# Patient Record
Sex: FEMALE | Race: OTHER | Employment: FULL TIME | URBAN - METROPOLITAN AREA
[De-identification: names, ages, dates, MRNs, and addresses within clinical notes are randomized per-mention and may not be internally consistent; named-entity substitution may affect disease eponyms.]

---

## 2017-01-24 ENCOUNTER — TELEPHONE (OUTPATIENT)
Dept: OBGYN CLINIC | Facility: CLINIC | Age: 32
End: 2017-01-24

## 2017-04-03 ENCOUNTER — OFFICE VISIT (OUTPATIENT)
Dept: OBGYN CLINIC | Facility: CLINIC | Age: 32
End: 2017-04-03

## 2017-04-03 VITALS
HEART RATE: 71 BPM | SYSTOLIC BLOOD PRESSURE: 127 MMHG | BODY MASS INDEX: 43.96 KG/M2 | HEIGHT: 62.5 IN | WEIGHT: 245 LBS | DIASTOLIC BLOOD PRESSURE: 84 MMHG

## 2017-04-03 DIAGNOSIS — Z12.4 CERVICAL CANCER SCREENING: ICD-10-CM

## 2017-04-03 DIAGNOSIS — Z01.419 ENCOUNTER FOR GYNECOLOGICAL EXAMINATION WITHOUT ABNORMAL FINDING: Primary | ICD-10-CM

## 2017-04-03 PROBLEM — Z86.711 HISTORY OF PULMONARY EMBOLISM: Status: ACTIVE | Noted: 2017-04-03

## 2017-04-03 PROCEDURE — 99395 PREV VISIT EST AGE 18-39: CPT | Performed by: OBSTETRICS & GYNECOLOGY

## 2017-04-03 NOTE — PROGRESS NOTES
Well Woman Exam    HPI:  The patient is a 28yo female who presents for annual exam. Pt had paragard IUD placed about three years ago. She has a history of PE/DVT. She states her periods are regular and overall light (heavy bleeding for two days).  She is co Controlled       MEDICATIONS:    Current outpatient prescriptions:   •  PARAGARD INTRAUTERINE COPPER Intrauterine IUD, 1, Disp: 1 Device, Rfl: 0    ALLERGIES:  No Known Allergies      Review of Systems:  Constitutional:  Denies fevers and chills   Car with the patient \  2. Cotesting ordered today- negative Pap smear in 2015   2. Contraception:   1. Paragard IUD  3. Preconception Counseling  1. Reviewed need to see MFM and anticoagulation before getting pregnant   4. Breast Health:     1.  Reviewed curre

## 2017-04-05 ENCOUNTER — TELEPHONE (OUTPATIENT)
Dept: OBGYN CLINIC | Facility: CLINIC | Age: 32
End: 2017-04-05

## 2017-04-05 NOTE — TELEPHONE ENCOUNTER
----- Message from Linh Saunders MD sent at 4/5/2017  2:21 PM CDT -----  Please send a letter to patient:    Your most recent Pap Smear and HPV were negative. Please make sure you call to make an appointment for an annual exam in one year.  If you have a

## 2018-01-23 ENCOUNTER — OFFICE VISIT (OUTPATIENT)
Dept: FAMILY MEDICINE CLINIC | Facility: CLINIC | Age: 33
End: 2018-01-23

## 2018-01-23 VITALS
HEART RATE: 76 BPM | SYSTOLIC BLOOD PRESSURE: 120 MMHG | HEIGHT: 62 IN | BODY MASS INDEX: 42.69 KG/M2 | WEIGHT: 232 LBS | DIASTOLIC BLOOD PRESSURE: 80 MMHG

## 2018-01-23 DIAGNOSIS — Z00.00 ROUTINE PHYSICAL EXAMINATION: Primary | ICD-10-CM

## 2018-01-23 DIAGNOSIS — Z86.711 HISTORY OF PULMONARY EMBOLISM: ICD-10-CM

## 2018-01-23 PROCEDURE — 90686 IIV4 VACC NO PRSV 0.5 ML IM: CPT | Performed by: FAMILY MEDICINE

## 2018-01-23 PROCEDURE — 99395 PREV VISIT EST AGE 18-39: CPT | Performed by: FAMILY MEDICINE

## 2018-01-23 PROCEDURE — 90471 IMMUNIZATION ADMIN: CPT | Performed by: FAMILY MEDICINE

## 2018-01-23 NOTE — PROGRESS NOTES
Blood pressure 120/80, pulse 76, height 5' 2\" (1.575 m), weight 232 lb (105.2 kg), not currently breastfeeding. REASON FOR VISIT:    Govind Clalejas is a 33year old female who presents for an [address]         Patient Active Problem L age<25, if sex active/on OCPs; >24 high risk No results found for: CHLAMYDIA, CPCRS    Colonoscopy Screen Every 10 years There are no preventive care reminders to display for this patient.     Flex Sigmoidoscopy Screen  Every 5 years No results found for th 12/23/2011 63    No flowsheet data found. Dilated Eye exam  Annually No flowsheet data found. No flowsheet data found. Asthma  (Annually between Nov. 1 & Dec. 31)    Date of last AAP/ACT and counseling given on importance of controller meds. EOMI, normal optic disk, conjunctiva are clear  NECK: supple, no adenopathy, no bruits  CHEST: no chest tenderness  BREAST: no dominant or suspicious mass  LUNGS: clear to auscultation  CARDIO: RRR without murmur  GI: good BS's, no masses, HSM or tendernes

## 2018-01-29 ENCOUNTER — TELEPHONE (OUTPATIENT)
Dept: FAMILY MEDICINE CLINIC | Facility: CLINIC | Age: 33
End: 2018-01-29

## 2018-01-29 ENCOUNTER — OFFICE VISIT (OUTPATIENT)
Dept: FAMILY MEDICINE CLINIC | Facility: CLINIC | Age: 33
End: 2018-01-29

## 2018-01-29 ENCOUNTER — APPOINTMENT (OUTPATIENT)
Dept: LAB | Age: 33
End: 2018-01-29
Attending: FAMILY MEDICINE
Payer: COMMERCIAL

## 2018-01-29 VITALS
DIASTOLIC BLOOD PRESSURE: 80 MMHG | WEIGHT: 227 LBS | HEIGHT: 62 IN | TEMPERATURE: 98 F | BODY MASS INDEX: 41.77 KG/M2 | HEART RATE: 90 BPM | SYSTOLIC BLOOD PRESSURE: 123 MMHG

## 2018-01-29 DIAGNOSIS — Z00.00 ROUTINE PHYSICAL EXAMINATION: ICD-10-CM

## 2018-01-29 DIAGNOSIS — J06.9 UPPER RESPIRATORY TRACT INFECTION, UNSPECIFIED TYPE: ICD-10-CM

## 2018-01-29 DIAGNOSIS — Z86.711 HISTORY OF PULMONARY EMBOLISM: ICD-10-CM

## 2018-01-29 DIAGNOSIS — J02.9 ACUTE PHARYNGITIS, UNSPECIFIED ETIOLOGY: Primary | ICD-10-CM

## 2018-01-29 DIAGNOSIS — R73.01 IFG (IMPAIRED FASTING GLUCOSE): Primary | ICD-10-CM

## 2018-01-29 LAB
CHOLEST SERPL-MCNC: 123 MG/DL (ref 110–200)
CONTROL LINE PRESENT WITH A CLEAR BACKGROUND (YES/NO): YES YES/NO
GLUCOSE SERPL-MCNC: 102 MG/DL (ref 70–99)
HDLC SERPL-MCNC: 49 MG/DL
KIT LOT #: NORMAL NUMERIC
LDLC SERPL CALC-MCNC: 59 MG/DL (ref 0–99)
NONHDLC SERPL-MCNC: 74 MG/DL
STREP GRP A CUL-SCR: NEGATIVE
TRIGL SERPL-MCNC: 74 MG/DL (ref 1–149)

## 2018-01-29 PROCEDURE — 82947 ASSAY GLUCOSE BLOOD QUANT: CPT

## 2018-01-29 PROCEDURE — 80061 LIPID PANEL: CPT

## 2018-01-29 PROCEDURE — 87880 STREP A ASSAY W/OPTIC: CPT | Performed by: FAMILY MEDICINE

## 2018-01-29 PROCEDURE — 99213 OFFICE O/P EST LOW 20 MIN: CPT | Performed by: FAMILY MEDICINE

## 2018-01-29 PROCEDURE — 99212 OFFICE O/P EST SF 10 MIN: CPT | Performed by: FAMILY MEDICINE

## 2018-01-29 PROCEDURE — 36415 COLL VENOUS BLD VENIPUNCTURE: CPT

## 2018-01-29 RX ORDER — AZELASTINE 1 MG/ML
2 SPRAY, METERED NASAL 2 TIMES DAILY
Qty: 1 BOTTLE | Refills: 0 | Status: SHIPPED | OUTPATIENT
Start: 2018-01-29 | End: 2018-05-25

## 2018-01-29 RX ORDER — DEXAMETHASONE 4 MG/1
4 TABLET ORAL
Qty: 5 TABLET | Refills: 0 | Status: SHIPPED | OUTPATIENT
Start: 2018-01-29 | End: 2018-02-03

## 2018-01-29 NOTE — TELEPHONE ENCOUNTER
Pt calling to report Angela told her Decadron rx not received by them. Spoke to pt [facility] and pharmacist states they do have rx for decadron and currently in process to be filled. They will contact pt when ready.

## 2018-01-29 NOTE — TELEPHONE ENCOUNTER
Pt would like to know if the medication sent to pharmacy will help with her fever. Informed pt to take OTC fever reducer. Tylenol or ibuprofen to help with fever. If not improved or fever return. Call office or f/u. D.r Canandaigua Countess agreed.  No further questions

## 2018-01-29 NOTE — PROGRESS NOTES
Patient ID: Carmelo Biswas is a 33year old female. HPI  Patient presents with:  Sore Throat  Ear Pain  Body ache and/or chills    Sore throat started yesterday morning. Later on in the day she started having some ear pain bilaterally.   The ear terry (103 kg), not currently breastfeeding. Physical Exam   Constitutional: Patient is oriented to person, place, and time. Patient appears well-developed and well-nourished. No distress. HENT:   Head: Normocephalic.    Right Ear: Tympanic membrane, external to plan.          [address] [place] [address], DO  1/29/2018

## 2018-01-29 NOTE — TELEPHONE ENCOUNTER
Pt was seen today . pharmacy stts they were informed by pt a script for antibiotic was prescribed, pharmacy did not receive script. Please advise.

## 2018-05-25 ENCOUNTER — OFFICE VISIT (OUTPATIENT)
Dept: OBGYN CLINIC | Facility: CLINIC | Age: 33
End: 2018-05-25

## 2018-05-25 VITALS
HEART RATE: 57 BPM | WEIGHT: 221.63 LBS | BODY MASS INDEX: 41 KG/M2 | SYSTOLIC BLOOD PRESSURE: 119 MMHG | DIASTOLIC BLOOD PRESSURE: 77 MMHG

## 2018-05-25 DIAGNOSIS — N89.8 VAGINAL ODOR: ICD-10-CM

## 2018-05-25 DIAGNOSIS — Z01.419 ENCOUNTER FOR GYNECOLOGICAL EXAMINATION WITHOUT ABNORMAL FINDING: Primary | ICD-10-CM

## 2018-05-25 PROCEDURE — 99395 PREV VISIT EST AGE 18-39: CPT | Performed by: OBSTETRICS & GYNECOLOGY

## 2018-05-25 NOTE — PROGRESS NOTES
Well Woman Exam    HPI:  The patient is a 34yo female who presents for annual exam. She has no complaints at this time. She is doing well with paragard. She feels she may have a slight fishy odor. No itching burning, irritation.  She is getting engaged next palpitations  Respiratory:  denies shortness of breath  Gastrointestinal:  denies nausea, vomiting diarrhea or constipation  Genitourinary:  denies dysuria, incontinence, abnormal vaginal discharge, vaginal itching  Musculoskeletal:  denies back pain.   Ski the individual  2. Reviewed breast self awareness   4. Vaginal culture for vaginal odor  5.  Follow up in 1 year

## 2018-05-29 ENCOUNTER — TELEPHONE (OUTPATIENT)
Dept: OBGYN CLINIC | Facility: CLINIC | Age: 33
End: 2018-05-29

## 2018-05-29 ENCOUNTER — MOBILE ENCOUNTER (OUTPATIENT)
Dept: OBGYN CLINIC | Facility: CLINIC | Age: 33
End: 2018-05-29

## 2018-05-29 RX ORDER — METRONIDAZOLE 500 MG/1
500 TABLET ORAL 2 TIMES DAILY
Qty: 14 TABLET | Refills: 0 | Status: SHIPPED | OUTPATIENT
Start: 2018-05-29 | End: 2018-06-05

## 2018-05-29 NOTE — TELEPHONE ENCOUNTER
Pt informed of results and recs. Advised pt to avoid etoh during tx because it can make her very ill. Pt verbalized understanding.  Rx sent to [facility] in [place] [facility] per pt request.

## 2018-05-29 NOTE — TELEPHONE ENCOUNTER
----- Message from Vickie Prader, MD sent at 5/26/2018  7:38 PM CDT -----  Please let patient know she has BV and send eRx for Flagyl 500mg BID for 7 days

## 2019-06-11 ENCOUNTER — OFFICE VISIT (OUTPATIENT)
Dept: OBGYN CLINIC | Facility: CLINIC | Age: 34
End: 2019-06-11

## 2019-06-11 VITALS
HEART RATE: 63 BPM | SYSTOLIC BLOOD PRESSURE: 129 MMHG | BODY MASS INDEX: 45 KG/M2 | DIASTOLIC BLOOD PRESSURE: 82 MMHG | WEIGHT: 244 LBS

## 2019-06-11 DIAGNOSIS — Z01.419 ENCOUNTER FOR GYNECOLOGICAL EXAMINATION WITHOUT ABNORMAL FINDING: Primary | ICD-10-CM

## 2019-06-11 DIAGNOSIS — Z30.431 IUD CHECK UP: ICD-10-CM

## 2019-06-11 PROCEDURE — 99395 PREV VISIT EST AGE 18-39: CPT | Performed by: OBSTETRICS & GYNECOLOGY

## 2019-06-11 NOTE — PROGRESS NOTES
Well Woman Exam    HPI:  The patient is a 33yo female who presents for annual exam. She has no complaints. She is doing well with Paragard IUD. She denies vaginal itching or odor this year.    Pt getting  in Oct. She would like to go to [facility] for week: Not on file        Minutes per session: Not on file      Stress: Not on file    Relationships      Social connections:        Talks on phone: Not on file        Gets together: Not on file        Attends [employer] service: Not on file        Active me 06/11/19  1619   BP: 129/82   Pulse: 63       PHYSICAL EXAM:   Constitutional: well developed, well nourished  Head/Face: normocephalic  Neck/Thyroid: thyroid symmetric, no thyromegaly, no nodules, no adenopathy  Heart: Regular rate and rhythm   Lungs: marcia

## 2019-10-17 ENCOUNTER — OFFICE VISIT (OUTPATIENT)
Dept: FAMILY MEDICINE CLINIC | Facility: CLINIC | Age: 34
End: 2019-10-17

## 2019-10-17 VITALS
BODY MASS INDEX: 44.16 KG/M2 | WEIGHT: 240 LBS | HEART RATE: 61 BPM | SYSTOLIC BLOOD PRESSURE: 122 MMHG | DIASTOLIC BLOOD PRESSURE: 81 MMHG | HEIGHT: 62 IN

## 2019-10-17 DIAGNOSIS — I83.813 VARICOSE VEINS OF BOTH LOWER EXTREMITIES WITH PAIN: Primary | ICD-10-CM

## 2019-10-17 PROCEDURE — 99213 OFFICE O/P EST LOW 20 MIN: CPT | Performed by: FAMILY MEDICINE

## 2019-10-17 NOTE — PROGRESS NOTES
Blood pressure 122/81, pulse 61, height 5' 2\" (1.575 m), weight 240 lb (108.9 kg), not currently breastfeeding. History of pulmonary embolism concerned about varicose vein on the right thigh.   Birth control pills were the cause of the pulmonary embolis

## 2019-12-24 ENCOUNTER — NURSE TRIAGE (OUTPATIENT)
Dept: FAMILY MEDICINE CLINIC | Facility: CLINIC | Age: 34
End: 2019-12-24

## 2019-12-24 NOTE — TELEPHONE ENCOUNTER
Action Requested: Summary for Provider     []  Critical Lab, Recommendations Needed  [] Need Additional Advice  []   FYI    []   Need Orders  [] Need Medications Sent to Pharmacy  []  Other     SUMMARY: Spoke with the patient who reports she went to the ER

## 2019-12-24 NOTE — TELEPHONE ENCOUNTER
Pt scheduled appt through [facility] with following sx:    Appointment For: Gloria Brito ([id number])   Visit Type: [employer] EXAM (2964)      1/8/2020     1:40 PM  20 mins. DO AURELIO [facility]      Patient Comments:   Back pain &

## 2019-12-26 ENCOUNTER — OFFICE VISIT (OUTPATIENT)
Dept: FAMILY MEDICINE CLINIC | Facility: CLINIC | Age: 34
End: 2019-12-26

## 2019-12-26 VITALS
BODY MASS INDEX: 45.08 KG/M2 | WEIGHT: 245 LBS | SYSTOLIC BLOOD PRESSURE: 126 MMHG | DIASTOLIC BLOOD PRESSURE: 83 MMHG | HEART RATE: 84 BPM | HEIGHT: 62 IN

## 2019-12-26 DIAGNOSIS — S39.012A STRAIN OF LUMBAR REGION, INITIAL ENCOUNTER: Primary | ICD-10-CM

## 2019-12-26 DIAGNOSIS — H53.40 VISUAL FIELD LOSS: ICD-10-CM

## 2019-12-26 PROCEDURE — 99213 OFFICE O/P EST LOW 20 MIN: CPT | Performed by: FAMILY MEDICINE

## 2019-12-26 RX ORDER — IBUPROFEN 600 MG/1
TABLET ORAL
COMMUNITY
Start: 2019-12-10 | End: 2020-02-12

## 2019-12-26 RX ORDER — DIAZEPAM 5 MG/1
TABLET ORAL
COMMUNITY
Start: 2019-12-10 | End: 2020-02-12

## 2019-12-31 NOTE — PROGRESS NOTES
Blood pressure 126/83, pulse 84, height 5' 2\" (1.575 m), weight 245 lb (111.1 kg), not currently breastfeeding. Patient presents today following up for episode of back pain that began December 8 has improved now it is currently about 2/10.   Shelley mcdaniel Thoracentesis began at 1004  Under one  Liter Straw fluid removed from pleural space  Patient tolerated procedure well.  Dressing CDI, no swelling or hematoma.  Report called to receiving RN.

## 2020-01-07 ENCOUNTER — TELEPHONE (OUTPATIENT)
Dept: FAMILY MEDICINE CLINIC | Facility: CLINIC | Age: 35
End: 2020-01-07

## 2020-01-07 NOTE — TELEPHONE ENCOUNTER
Please clarify with office of Dr. Myla Dow if he wants patient to be referred for neuro ophthalmology eval.

## 2020-01-08 NOTE — TELEPHONE ENCOUNTER
phone: [phone] (Dr Link Del Toro)   Office sf closed,   Left VM for staff to return phone call, see message below

## 2020-01-10 NOTE — TELEPHONE ENCOUNTER
Left detailed message with info below. To call back to clarify [facility] question.

## 2020-01-13 ENCOUNTER — PATIENT MESSAGE (OUTPATIENT)
Dept: FAMILY MEDICINE CLINIC | Facility: CLINIC | Age: 35
End: 2020-01-13

## 2020-01-13 ENCOUNTER — TELEPHONE (OUTPATIENT)
Dept: FAMILY MEDICINE CLINIC | Facility: CLINIC | Age: 35
End: 2020-01-13

## 2020-01-13 DIAGNOSIS — H53.16: Primary | ICD-10-CM

## 2020-01-13 NOTE — TELEPHONE ENCOUNTER
Naheed Valente from Dr Madison Wallace returning call please call other back @ [phone]. Please call her back.

## 2020-01-13 NOTE — TELEPHONE ENCOUNTER
Received call from Dr. Wayne Slight office, spoke to Ann-Marie, states Dr. Reji Weiner does want patient to be evaluated from neuro ophthalmology.

## 2020-01-13 NOTE — TELEPHONE ENCOUNTER
Attempted to call office, no answer. Left detailed vm regarding message below. Provided our office number in vm.

## 2020-01-14 NOTE — TELEPHONE ENCOUNTER
Spoke with pt to give central scheduling phone number. Pt states she was informed MRI has been ordered and has just scheduled an appointment to have test don 1/24/20.

## 2020-01-14 NOTE — TELEPHONE ENCOUNTER
From: Opal Fernández  To: Analisa Fuller DO  Sent: 1/13/2020 9:06 AM CST  Subject: Visit Follow-up Question    On 12.26.19 I saw Dr. Larissa Nixon for an issue with vision loss. He referred me to an ophthalmologist, who I saw on 12.27.19.  The ophthalmolo

## 2020-01-14 NOTE — TELEPHONE ENCOUNTER
----- Message from Marck Hinojosa sent at 1/13/2020  9:06 AM CST -----  Regarding: Visit Follow-up Question  Contact: [phone]  On 12.26.19 I saw Dr. Jarvis Thompson for an issue with vision loss.  He referred me to an ophthalmologist, who I saw on 12.27.19

## 2020-01-14 NOTE — TELEPHONE ENCOUNTER
DAXA Hernandez calling with Dr. Eric Madrid (neuro opthalmologist) office attempting to order MRI Brain and orbits with/without contrast for partial loss of vision in right eye.  DAXA Hernandez states unable to order under test under Dr. Deandra Piña when kings

## 2020-01-16 ENCOUNTER — TELEPHONE (OUTPATIENT)
Dept: FAMILY MEDICINE CLINIC | Facility: CLINIC | Age: 35
End: 2020-01-16

## 2020-01-16 DIAGNOSIS — H54.7 VISUAL LOSS: Primary | ICD-10-CM

## 2020-01-16 NOTE — TELEPHONE ENCOUNTER
The MRI technician called on behalf of radiologist requesting that the order for the MRI BRAIN/ORBITS  be changed to with and without contrast   Patient is scheduled for MRI tomorrow at 11 a.m.      Dr. Rosi Hudson:   Please see pended order and sign

## 2020-01-17 ENCOUNTER — PATIENT MESSAGE (OUTPATIENT)
Dept: FAMILY MEDICINE CLINIC | Facility: CLINIC | Age: 35
End: 2020-01-17

## 2020-01-17 ENCOUNTER — TELEPHONE (OUTPATIENT)
Dept: FAMILY MEDICINE CLINIC | Facility: CLINIC | Age: 35
End: 2020-01-17

## 2020-01-17 ENCOUNTER — HOSPITAL ENCOUNTER (OUTPATIENT)
Dept: MRI IMAGING | Facility: HOSPITAL | Age: 35
Discharge: HOME | End: 2020-01-17
Attending: FAMILY MEDICINE
Payer: COMMERCIAL

## 2020-01-17 DIAGNOSIS — H54.7 VISUAL LOSS: ICD-10-CM

## 2020-01-17 DIAGNOSIS — H53.16: ICD-10-CM

## 2020-01-17 PROCEDURE — A9575 INJ GADOTERATE MEGLUMI 0.1ML: HCPCS | Performed by: FAMILY MEDICINE

## 2020-01-17 PROCEDURE — 70543 MRI ORBT/FAC/NCK W/O &W/DYE: CPT | Performed by: FAMILY MEDICINE

## 2020-01-17 PROCEDURE — 70553 MRI BRAIN STEM W/O & W/DYE: CPT | Performed by: FAMILY MEDICINE

## 2020-01-17 NOTE — TELEPHONE ENCOUNTER
Dr. Javier Hendrix, please advise on patient's most recent mychart message. Called Dr. Maikel Shetty' office and call is routed to answering service. Nurses please follow up tomorrow.

## 2020-01-17 NOTE — TELEPHONE ENCOUNTER
Please see My Chart message and advise to pt request for results. Thank you. Attempted to call Dr Belle Philippe [facility] Op[facility] for fax number. Office is closed.   Nurses please call back and fax results per Dr Luann Rey

## 2020-01-17 NOTE — TELEPHONE ENCOUNTER
From: Corona May  To: Ridge Fuller DO  Sent: 1/17/2020 1:47 PM CST  Subject: Test Results Question    I have a question about MRI Scan Head or Neck resulted on 1/17/20, 1:26 PM.    Will I be hearing from you in regards to my test results?  What

## 2020-01-17 NOTE — TELEPHONE ENCOUNTER
Dr. Kapadia Cooper:     Patient has more questions about MRI orbits results. See message in My Chart sent at 3:15 p.m. Patient is requesting that you call her to discuss results. Patient states she is available the rest of the day.

## 2020-01-17 NOTE — TELEPHONE ENCOUNTER
Barbara Crain   to Santhosh Galdamez, DO           1/17/20 3:15 PM   Ok, but the test result said:     Tiny Chronic Lacunar Infarct on the right side of my brain, what is that? Should I be doing something to prevent it?      If I understand correctly, I have a question about MRI Scan Head or Neck resulted on 1/17/20, 1:26 PM.    Will I be hearing from you in regards to my test results? What are my next steps? Thank you.    Jacquie Zurita   to Stuart Bear, DO

## 2020-01-17 NOTE — TELEPHONE ENCOUNTER
Called patient and discussed I advised her to contact dr. Ivis Fletcher also that she should go to er if visual loss occurs again.

## 2020-01-18 NOTE — TELEPHONE ENCOUNTER
Attempted to call Dr Gearold Litten Neuro Ophthal[facility] for fax number. Office is closed today, Sat.  Nurses please call back Monday and fax results per Dr Ananda Edouard message below.      Office number for Dr Ila Gao [phone]

## 2020-01-20 ENCOUNTER — TELEPHONE (OUTPATIENT)
Dept: FAMILY MEDICINE CLINIC | Facility: CLINIC | Age: 35
End: 2020-01-20

## 2020-01-20 NOTE — TELEPHONE ENCOUNTER
----- Message from González Horvath RN sent at 1/20/2020 10:50 AM CST -----    ----- Message -----  From: Kingston Amador DO  Sent: 1/17/2020   1:39 PM CST  To: Em Rn Triage    Please forward MRI results to Dr. Teofilo Dunlap Neuro Ophthalmology.

## 2020-01-20 NOTE — TELEPHONE ENCOUNTER
Spoke with Dr Zeynep Brito office and fax is [phone]    MRI 1/16/20 and 1/17/20 faxed to Dr Zeynep Brito office.

## 2020-02-04 ENCOUNTER — TELEPHONE (OUTPATIENT)
Dept: FAMILY MEDICINE CLINIC | Facility: CLINIC | Age: 35
End: 2020-02-04

## 2020-02-04 DIAGNOSIS — H54.7 VISUAL LOSS: Primary | ICD-10-CM

## 2020-02-04 DIAGNOSIS — Z86.711 HISTORY OF PULMONARY EMBOLISM: ICD-10-CM

## 2020-02-04 NOTE — TELEPHONE ENCOUNTER
Spoke with patient ( verified)--reports Dr. Marianela Hatch ([phone]) sent letter to Dr. Dipesh Riley 2020 to order lab tests (clotting factors per patient) to further evaluate ongoing vision loss symptoms.     Patient and Dr. Karyna Reynaga concerned [facility]

## 2020-02-05 NOTE — TELEPHONE ENCOUNTER
Delivery Read From Message Type Attachments Subject   2/4/2020  4:02 PM [place] Morning Patient Medical Advice Request  Orders

## 2020-02-12 ENCOUNTER — TELEPHONE (OUTPATIENT)
Dept: NEUROLOGY | Facility: CLINIC | Age: 35
End: 2020-02-12

## 2020-02-12 ENCOUNTER — OFFICE VISIT (OUTPATIENT)
Dept: NEUROLOGY | Facility: CLINIC | Age: 35
End: 2020-02-12

## 2020-02-12 VITALS
HEIGHT: 63 IN | HEART RATE: 72 BPM | WEIGHT: 231 LBS | SYSTOLIC BLOOD PRESSURE: 122 MMHG | DIASTOLIC BLOOD PRESSURE: 82 MMHG | BODY MASS INDEX: 40.93 KG/M2

## 2020-02-12 DIAGNOSIS — R29.818 TRANSIENT NEUROLOGIC DEFICIT: ICD-10-CM

## 2020-02-12 DIAGNOSIS — R93.0 ABNORMAL MRI OF HEAD: Primary | ICD-10-CM

## 2020-02-12 PROCEDURE — 99205 OFFICE O/P NEW HI 60 MIN: CPT | Performed by: OTHER

## 2020-02-12 NOTE — TELEPHONE ENCOUNTER
Pt. informed insurance was verified and 2 D echo doppler/bubbles cpt code 02400  is a covered benefit and does not require authorization. She will call [place] scheduling to schedule appt.

## 2020-02-12 NOTE — PROGRESS NOTES
Neurology Initial Visit     Referred By: Dr. Langley Friend    Chief Complaint: Patient presents with:  Vision Problem: Patient presents today c/o vision trouble in right eye.  She states that it started about 6 months ago where she initially she lost bottom ha standard drinks/week   Comment: Occasionally       Drug use:    Types: Cannabis   Comment: RARELY       Family History   Problem Relation Age of Onset   • Hypertension Mother         Controlled         Current Outpatient Medications:   •  PARAGARD INTRAUTE sign    Coordination:  Finger to nose intact  Rapid alternating movements intact    Gait:  Normal posture  Normal physiologic      Labs:    Lab Results   Component Value Date    TSH 2.81 02/07/2012     Lab Results   Component Value Date    HDL 49 01/29/201 understanding of information given. All questions were answered. All side effects of drugs were discussed. Return to clinic in: Return if symptoms worsen or fail to improve.     Anais Awad MD

## 2020-02-18 ENCOUNTER — HOSPITAL ENCOUNTER (OUTPATIENT)
Dept: CV DIAGNOSTICS | Facility: HOSPITAL | Age: 35
Discharge: HOME | End: 2020-02-18
Attending: FAMILY MEDICINE
Payer: COMMERCIAL

## 2020-02-18 ENCOUNTER — LAB ENCOUNTER (OUTPATIENT)
Dept: LAB | Facility: HOSPITAL | Age: 35
End: 2020-02-18
Attending: FAMILY MEDICINE
Payer: COMMERCIAL

## 2020-02-18 DIAGNOSIS — H54.7 VISUAL LOSS: ICD-10-CM

## 2020-02-18 DIAGNOSIS — H54.7 UNSPECIFIED VISUAL LOSS: Primary | ICD-10-CM

## 2020-02-18 DIAGNOSIS — Z86.711 HISTORY OF PULMONARY EMBOLISM: ICD-10-CM

## 2020-02-18 DIAGNOSIS — Z86.711 PERSONAL HISTORY OF PE (PULMONARY EMBOLISM): ICD-10-CM

## 2020-02-18 LAB — HCYS SERPL-SCNC: 7.6 UMOL/L (ref 3.2–10.7)

## 2020-02-18 PROCEDURE — 85306 CLOT INHIBIT PROT S FREE: CPT

## 2020-02-18 PROCEDURE — 85300 ANTITHROMBIN III ACTIVITY: CPT

## 2020-02-18 PROCEDURE — 83090 ASSAY OF HOMOCYSTEINE: CPT

## 2020-02-18 PROCEDURE — 86147 CARDIOLIPIN ANTIBODY EA IG: CPT

## 2020-02-18 PROCEDURE — 86255 FLUORESCENT ANTIBODY SCREEN: CPT

## 2020-02-18 PROCEDURE — 81241 F5 GENE: CPT

## 2020-02-18 PROCEDURE — 85549 MURAMIDASE: CPT

## 2020-02-18 PROCEDURE — 93306 TTE W/DOPPLER COMPLETE: CPT | Performed by: FAMILY MEDICINE

## 2020-02-18 PROCEDURE — 85302 CLOT INHIBIT PROT C ANTIGEN: CPT

## 2020-02-18 PROCEDURE — 36415 COLL VENOUS BLD VENIPUNCTURE: CPT

## 2020-02-19 LAB — F5 P.R506Q BLD/T QL: NORMAL

## 2020-02-20 LAB
ANTITHROMBIN, ENZYMATIC (ACTIVITY): 109 %
CARDIOLIPIN IGG SERPL-ACNC: 3.9 GPL (ref 0–14.9)
CARDIOLIPIN IGM SERPL-ACNC: 9.4 MPL (ref 0–12.4)
PROTEIN S FUNCTIONAL: 110 %

## 2020-02-21 LAB — PROTEIN C, TOTAL ANTIGEN: >95 %

## 2020-02-22 LAB — LYSOZYME, SERUM OR BODY FLUID: 0.98 UG/ML

## 2020-02-27 ENCOUNTER — OFFICE VISIT (OUTPATIENT)
Dept: CARDIOLOGY CLINIC | Facility: CLINIC | Age: 35
End: 2020-02-27

## 2020-02-27 ENCOUNTER — NURSE ONLY (OUTPATIENT)
Dept: CARDIOLOGY CLINIC | Facility: CLINIC | Age: 35
End: 2020-02-27

## 2020-02-27 VITALS
HEIGHT: 63 IN | RESPIRATION RATE: 18 BRPM | BODY MASS INDEX: 41.16 KG/M2 | DIASTOLIC BLOOD PRESSURE: 87 MMHG | WEIGHT: 232.31 LBS | SYSTOLIC BLOOD PRESSURE: 128 MMHG | HEART RATE: 79 BPM

## 2020-02-27 DIAGNOSIS — H53.469: ICD-10-CM

## 2020-02-27 DIAGNOSIS — G45.3 AMAUROSIS FUGAX OF RIGHT EYE: ICD-10-CM

## 2020-02-27 DIAGNOSIS — I35.1 MODERATE AORTIC REGURGITATION: Primary | ICD-10-CM

## 2020-02-27 DIAGNOSIS — Z86.711 HISTORY OF PULMONARY EMBOLISM: ICD-10-CM

## 2020-02-27 DIAGNOSIS — I63.81 LACUNAR INFARCTION (HCC): ICD-10-CM

## 2020-02-27 PROCEDURE — 99245 OFF/OP CONSLTJ NEW/EST HI 55: CPT | Performed by: INTERNAL MEDICINE

## 2020-02-27 PROCEDURE — 93000 ELECTROCARDIOGRAM COMPLETE: CPT | Performed by: INTERNAL MEDICINE

## 2020-02-27 PROCEDURE — 93270 REMOTE 30 DAY ECG REV/REPORT: CPT | Performed by: INTERNAL MEDICINE

## 2020-02-27 RX ORDER — ASPIRIN 81 MG/1
81 TABLET, CHEWABLE ORAL DAILY
Qty: 365 TABLET | Refills: 0 | Status: SHIPPED | OUTPATIENT
Start: 2020-02-27

## 2020-02-27 NOTE — PROGRESS NOTES
Event monitor placed on patient during visit today. Instructed patient on how to place/remove device and care for the monitor. Instructed patient to notify ordering physician if symptoms worsen  during monitoring.  Patient was instructed to call [facility]

## 2020-02-27 NOTE — PATIENT INSTRUCTIONS
-Start aspirin 81 mg daily  -Cardiac MRI  -Cancel Holter monitor  -Complete 30-day event monitor  -Follow-up above results with Dr. Cesar Rizo, and if indicated proceed with loop recorder implantation  -Delay family planning/pregnancy until cardiac diagnoses and

## 2020-02-27 NOTE — H&P
[facility]    Cardiac Electrophysiology Consultation  2020    Name:  Oli Bender  : 3/24/1985    Date of consultation:   2020    Referring physician: Dr. Danielle Almodovar    Reason for Consultation:  Abnormal echocardiogram    History of P Recently . Allergies:  No Known Allergies    Medications:  PARAGARD INTRAUTERINE COPPER Intrauterine IUD, 1, Disp: 1 Device, Rfl: 0    No current facility-administered medications on file prior to visit.      Review of Systems:  GENERAL: no fevers mildly     calcified leaflets. Moderate regurgitation.     IMPRESSIONS / RECOMMENDATIONS:  Moderate aortic regurgitation  (primary encounter diagnosis)  Amaurosis fugax of right eye  Transient homonymous hemianopia, unspecified laterality  Lacunar infarctio thyroid, and lipids. If these have not been performed, we will order them before our next visit. Thank you for the consultation.      Dearkaroline Ramirez MD  Cardiology/Cardiac Electrophysiology  2/27/2020

## 2020-03-04 ENCOUNTER — TELEPHONE (OUTPATIENT)
Dept: NEUROLOGY | Facility: CLINIC | Age: 35
End: 2020-03-04

## 2020-03-04 ENCOUNTER — APPOINTMENT (OUTPATIENT)
Dept: CV DIAGNOSTICS | Facility: HOSPITAL | Age: 35
End: 2020-03-04
Attending: Other
Payer: COMMERCIAL

## 2020-03-04 ENCOUNTER — HOSPITAL ENCOUNTER (OUTPATIENT)
Dept: MRI IMAGING | Facility: HOSPITAL | Age: 35
Discharge: HOME | End: 2020-03-04
Attending: Other
Payer: COMMERCIAL

## 2020-03-04 DIAGNOSIS — R93.0 ABNORMAL MRI OF HEAD: ICD-10-CM

## 2020-03-04 DIAGNOSIS — R29.818 TRANSIENT NEUROLOGIC DEFICIT: ICD-10-CM

## 2020-03-04 PROCEDURE — 70549 MR ANGIOGRAPH NECK W/O&W/DYE: CPT | Performed by: OTHER

## 2020-03-04 PROCEDURE — A9575 INJ GADOTERATE MEGLUMI 0.1ML: HCPCS | Performed by: OTHER

## 2020-03-04 PROCEDURE — 70544 MR ANGIOGRAPHY HEAD W/O DYE: CPT | Performed by: OTHER

## 2020-03-04 NOTE — TELEPHONE ENCOUNTER
Left detailed message for patient notifying her of below. Asked her to call the office if she had any questions.

## 2020-03-04 NOTE — TELEPHONE ENCOUNTER
----- Message from Kevin Kearns MD sent at 3/4/2020  2:31 PM CST -----  Please let patient know that MRA of the head and neck did not show any aneurysms, no vascular problems.

## 2020-03-10 ENCOUNTER — OFFICE VISIT (OUTPATIENT)
Dept: NEUROLOGY | Facility: CLINIC | Age: 35
End: 2020-03-10

## 2020-03-10 VITALS
DIASTOLIC BLOOD PRESSURE: 62 MMHG | HEART RATE: 70 BPM | WEIGHT: 230 LBS | BODY MASS INDEX: 40.75 KG/M2 | SYSTOLIC BLOOD PRESSURE: 130 MMHG | RESPIRATION RATE: 16 BRPM | HEIGHT: 63 IN

## 2020-03-10 DIAGNOSIS — H53.9 VISUAL DISTURBANCE: Primary | ICD-10-CM

## 2020-03-10 DIAGNOSIS — R90.89 ABNORMAL FINDING ON MRI OF BRAIN: ICD-10-CM

## 2020-03-10 PROCEDURE — 99214 OFFICE O/P EST MOD 30 MIN: CPT | Performed by: OTHER

## 2020-03-10 NOTE — PROGRESS NOTES
HPI:    Patient ID: Flex Fleming is a 36year old female. Referring provider: Dr Jose Morales  PCP: Dr Dione Manrique    Thank you for requesting this consultation to us.  Below it the summary of my evaluation    HPI   Flex Fleming is a 36year old right • Lacunar infarction ([facility] [address] 2/27/2020   • Moderate aortic regurgitation 2/27/2020   • Pulmonary embolism ([address] 2013    from OCP  Coumadin   • Transient homonymous hemianopia 2/27/2020      History reviewed. No pertinent surgical history.    Family History affect. Neurological   Awake, alert and oriented to time, place and person. Speech is fluent with intact comprehension, repetition and naming. Normal attention and memory. Higher cortical function intact.   Cranial nerves:   II, III, IV, VI :Pupils round, her know   I will see her back in about 2-3 months  I discussed with the patient the possible diagnoses, general treatment and plan and addressed any concerns and questions. Thank you for allowing us to participate in your patient's care.      Danyelle DE ANDA

## 2020-03-13 ENCOUNTER — TELEPHONE (OUTPATIENT)
Dept: NEUROLOGY | Facility: CLINIC | Age: 35
End: 2020-03-13

## 2020-03-13 ENCOUNTER — HOSPITAL ENCOUNTER (OUTPATIENT)
Dept: MRI IMAGING | Facility: HOSPITAL | Age: 35
Discharge: HOME | End: 2020-03-13
Attending: INTERNAL MEDICINE
Payer: COMMERCIAL

## 2020-03-13 ENCOUNTER — LAB ENCOUNTER (OUTPATIENT)
Dept: LAB | Facility: HOSPITAL | Age: 35
End: 2020-03-13
Attending: INTERNAL MEDICINE
Payer: COMMERCIAL

## 2020-03-13 DIAGNOSIS — R93.0 ABNORMAL MRI OF HEAD: ICD-10-CM

## 2020-03-13 DIAGNOSIS — H54.7 VISUAL LOSS: Primary | ICD-10-CM

## 2020-03-13 DIAGNOSIS — H53.9 VISUAL DISTURBANCE: ICD-10-CM

## 2020-03-13 DIAGNOSIS — I35.1 MODERATE AORTIC REGURGITATION: ICD-10-CM

## 2020-03-13 DIAGNOSIS — Z86.711 HISTORY OF PULMONARY EMBOLISM: ICD-10-CM

## 2020-03-13 DIAGNOSIS — G45.3 AMAUROSIS FUGAX OF RIGHT EYE: ICD-10-CM

## 2020-03-13 LAB
CARDIOLIPIN IGG SERPL-ACNC: 0.9 GPL (ref ?–10)
CHOLEST SMN-MCNC: 129 MG/DL (ref ?–200)
EST. AVERAGE GLUCOSE BLD GHB EST-MCNC: 108 MG/DL (ref 68–126)
HBA1C MFR BLD HPLC: 5.4 % (ref ?–5.7)
HDLC SERPL-MCNC: 47 MG/DL (ref 40–59)
LDLC SERPL CALC-MCNC: 73 MG/DL (ref ?–100)
NONHDLC SERPL-MCNC: 82 MG/DL (ref ?–130)
PATIENT FASTING Y/N/NP: YES
TRIGL SERPL-MCNC: 47 MG/DL (ref 30–149)
VLDLC SERPL CALC-MCNC: 9 MG/DL (ref 0–30)

## 2020-03-13 PROCEDURE — 80061 LIPID PANEL: CPT

## 2020-03-13 PROCEDURE — A9575 INJ GADOTERATE MEGLUMI 0.1ML: HCPCS | Performed by: INTERNAL MEDICINE

## 2020-03-13 PROCEDURE — 86147 CARDIOLIPIN ANTIBODY EA IG: CPT

## 2020-03-13 PROCEDURE — 36415 COLL VENOUS BLD VENIPUNCTURE: CPT

## 2020-03-13 PROCEDURE — 75561 CARDIAC MRI FOR MORPH W/DYE: CPT | Performed by: INTERNAL MEDICINE

## 2020-03-13 PROCEDURE — 83036 HEMOGLOBIN GLYCOSYLATED A1C: CPT

## 2020-03-13 NOTE — TELEPHONE ENCOUNTER
----- Message from Tasha Umaña MD sent at 3/13/2020  8:27 AM CDT -----  Please let the patient know that results of these particular lab tests so far were normal.    Thank you

## 2020-03-16 ENCOUNTER — TELEPHONE (OUTPATIENT)
Dept: FAMILY MEDICINE CLINIC | Facility: CLINIC | Age: 35
End: 2020-03-16

## 2020-03-16 ENCOUNTER — PATIENT MESSAGE (OUTPATIENT)
Dept: FAMILY MEDICINE CLINIC | Facility: CLINIC | Age: 35
End: 2020-03-16

## 2020-03-16 ENCOUNTER — PATIENT MESSAGE (OUTPATIENT)
Dept: CARDIOLOGY CLINIC | Facility: CLINIC | Age: 35
End: 2020-03-16

## 2020-03-16 NOTE — TELEPHONE ENCOUNTER
From: Radha Stinson  To: Arnel Ascencio  Sent: 3/16/2020 1:53 PM CDT  Subject: Note    Hello, I have sent you a note excusing you for 8 weeks per Dr. Gino Bennett, if you need us to fax it or would like a hard copy please give us a call or send us a message.  He wo

## 2020-03-16 NOTE — TELEPHONE ENCOUNTER
Note generated & sent via [employer], if pt needs it faxed, please have her provide a fax number

## 2020-03-16 NOTE — TELEPHONE ENCOUNTER
Patient reports recently had an MRI completed on Fri, she followed up with Dr Tita Braxton via [employer] but has not received a response. She is wanting to know if it is safe for her to be at work right now because she has a heart condition.  Denied having any concer

## 2020-03-16 NOTE — TELEPHONE ENCOUNTER
From: Carmelo Biswas  To: Boni Peters MD  Sent: 3/16/2020 8:43 AM CDT  Subject: Test Results Question    Good morning,     On Friday, March 13th, 2020 I completed the Heart MRI that Dr. Jacqueline Greer requested.  My work is still requiring us to go into work, I

## 2020-03-16 NOTE — TELEPHONE ENCOUNTER
Patient calling to follow-up on message below.      Dr Jeffrey Arshad;   Please see message below and advise

## 2020-03-17 ENCOUNTER — TELEPHONE (OUTPATIENT)
Dept: CARDIOLOGY CLINIC | Facility: CLINIC | Age: 35
End: 2020-03-17

## 2020-03-17 NOTE — TELEPHONE ENCOUNTER
Reviewed results and Dr. Tim Thompson comments. Scheduled follow up for April, after event monitor is complete.

## 2020-03-17 NOTE — TELEPHONE ENCOUNTER
Pt states that she has an option to work from home and would like the note updated for her to return to work on April 6, 2020. Please, call pt at [phone] with any questions and also please put the updated note on her my chart when done.

## 2020-03-17 NOTE — TELEPHONE ENCOUNTER
Cardiac MRI result:      Notes recorded by MACRINA Diaz on 3/16/2020 at 6:56 PM CDT  pls review with Dr. Summer Swenson, pt has bicuspid aortic valve

## 2020-03-25 ENCOUNTER — PATIENT MESSAGE (OUTPATIENT)
Dept: CARDIOLOGY CLINIC | Facility: CLINIC | Age: 35
End: 2020-03-25

## 2020-03-26 NOTE — TELEPHONE ENCOUNTER
From: Gloria Brito  To: Jenni Perla MD  Sent: 3/25/2020 9:40 PM CDT  Subject: Visit Follow-up Question    Hello,     I was told that I needed to wear my heart monitor for 30 days. This Friday will be the 30th day I wear it. What should I do then?  Do

## 2020-03-31 ENCOUNTER — APPOINTMENT (OUTPATIENT)
Dept: CARDIOLOGY CLINIC | Facility: CLINIC | Age: 35
End: 2020-03-31

## 2020-03-31 DIAGNOSIS — Z86.711 HISTORY OF PULMONARY EMBOLISM: ICD-10-CM

## 2020-03-31 DIAGNOSIS — I35.1 MODERATE AORTIC REGURGITATION: ICD-10-CM

## 2020-03-31 DIAGNOSIS — H53.469: ICD-10-CM

## 2020-03-31 DIAGNOSIS — G45.3 AMAUROSIS FUGAX OF RIGHT EYE: ICD-10-CM

## 2020-03-31 DIAGNOSIS — I63.81 LACUNAR INFARCTION (HCC): ICD-10-CM

## 2020-04-09 ENCOUNTER — VIRTUAL PHONE E/M (OUTPATIENT)
Dept: CARDIOLOGY CLINIC | Facility: CLINIC | Age: 35
End: 2020-04-09

## 2020-04-09 ENCOUNTER — PATIENT MESSAGE (OUTPATIENT)
Dept: CARDIOLOGY CLINIC | Facility: CLINIC | Age: 35
End: 2020-04-09

## 2020-04-09 DIAGNOSIS — I35.1 NONRHEUMATIC AORTIC VALVE INSUFFICIENCY: ICD-10-CM

## 2020-04-09 DIAGNOSIS — Q23.1 BICUSPID AORTIC VALVE: Primary | ICD-10-CM

## 2020-04-09 DIAGNOSIS — I63.81 LACUNAR INFARCTION (HCC): ICD-10-CM

## 2020-04-09 DIAGNOSIS — G45.3 AMAUROSIS FUGAX OF RIGHT EYE: ICD-10-CM

## 2020-04-09 PROBLEM — Q23.81 BICUSPID AORTIC VALVE: Status: ACTIVE | Noted: 2020-04-09

## 2020-04-09 PROCEDURE — 99214 OFFICE O/P EST MOD 30 MIN: CPT | Performed by: INTERNAL MEDICINE

## 2020-04-09 NOTE — TELEPHONE ENCOUNTER
From: Malorie Hernandez  To: Aj Hood MD  Sent: 4/9/2020 10:03 AM CDT  Subject: Visit Follow-up Question    Dr. Alissa Yanez,     I forgot to ask about going back to work?  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

## 2020-04-09 NOTE — PATIENT INSTRUCTIONS
-no significant weight lifting.  -obtain screening of her first degree family members for aortic valve abnormalities and aortopathies  -visit with a high-risk OB prior to planning pregnancy  -repeat echocardiogram in 1 year  -follow-up with Dr Jonathan Morales in 1 ye

## 2020-04-09 NOTE — PROGRESS NOTES
[facility]    Cardiac Electrophysiology Progress Note  4/9/2020    This visit is conducted using Telemedicine with live, interactive video and audio.   Provider location: [facility]  Patient location: Home      HPI:   Edgard Torres is a 30 ye 1. Bicuspid aortic valve  (primary encounter diagnosis)  2. Nonrheumatic aortic valve insufficiency  3. Lacunar infarction ([facility] [address]  4. Amaurosis fugax of right eye    The patient is a 30year old with transient visual loss without identifiable cause.   She

## 2020-04-14 ENCOUNTER — PATIENT MESSAGE (OUTPATIENT)
Dept: OBGYN CLINIC | Facility: CLINIC | Age: 35
End: 2020-04-14

## 2020-04-14 ENCOUNTER — TELEPHONE (OUTPATIENT)
Dept: NEUROLOGY | Facility: CLINIC | Age: 35
End: 2020-04-14

## 2020-04-14 NOTE — TELEPHONE ENCOUNTER
From: Arnel Ascencio  To: Shiloh Buitrago MD  Sent: 4/14/2020 3:03 PM CDT  Subject: Lora Antohny,     My annual appointment on Jun 16th, 2020 at 4:20pm. I currently have Para-guard as my birthday control and I am interested in getting it rem

## 2020-04-20 ENCOUNTER — TELEPHONE (OUTPATIENT)
Dept: NEUROLOGY | Facility: CLINIC | Age: 35
End: 2020-04-20

## 2020-04-20 NOTE — TELEPHONE ENCOUNTER
LOV with Dr Eula Roman 2/12/20. 2nd opinion with Dr Nita Robles 3/10/20. NOV with Dr Nita Robles 6/9/20. Patient had video visit with Dr Kitchen Standing 4/20/20 that was no show.  I spoke to patient, confirmed she does not want to see another neurologist Dr Kitchen Standing

## 2020-05-07 ENCOUNTER — PATIENT MESSAGE (OUTPATIENT)
Dept: FAMILY MEDICINE CLINIC | Facility: CLINIC | Age: 35
End: 2020-05-07

## 2020-05-07 NOTE — TELEPHONE ENCOUNTER
Regarding: Other  Contact: [phone]  ----- Message from Shorty Rod RN sent at 5/7/2020 12:54 PM CDT -----       ----- Message from Hunter Bell to Stuart Bear DO sent at 5/7/2020 12:03 PM -----   Hello,     I was issued a letter for

## 2020-05-07 NOTE — TELEPHONE ENCOUNTER
From: Corona May  To: Ridge Fuller DO  Sent: 5/7/2020 12:03 PM CDT  Subject: Other    Hello,     I was issued a letter for my job in regards to working from home.  After my last meeting with my cardiologist he mentioned that I am able to return

## 2020-05-21 ENCOUNTER — TELEPHONE (OUTPATIENT)
Dept: FAMILY MEDICINE CLINIC | Facility: CLINIC | Age: 35
End: 2020-05-21

## 2020-05-21 DIAGNOSIS — H54.7 VISUAL LOSS: Primary | ICD-10-CM

## 2020-05-21 NOTE — TELEPHONE ENCOUNTER
Patient has an appointment with Dr. Norma Mclaughlin with [address] on 5/26. She is requesting a referral be faxed to their office at [phone].

## 2020-05-22 NOTE — TELEPHONE ENCOUNTER
Called patient to find out reason for referral.   Per patient, she seen Dr Maikel Shetty in January for vision loss in right eye.    Patient  states she has seen a lot of specialist since but was called by [facility] stating  she needs a follow-up with

## 2020-05-26 PROCEDURE — 93272 ECG/REVIEW INTERPRET ONLY: CPT | Performed by: INTERNAL MEDICINE

## 2020-06-04 ENCOUNTER — PATIENT MESSAGE (OUTPATIENT)
Dept: OBGYN CLINIC | Facility: CLINIC | Age: 35
End: 2020-06-04

## 2020-06-04 NOTE — TELEPHONE ENCOUNTER
From: Carmelo Biswas  To: Aminta Guardado MD  Sent: 6/4/2020 10:09 AM CDT  Subject: Other    Hello,    I recently requested to have my [place]'s birth control taken out at my up coming appointment on June 16th.  I have decided not to go through with th

## 2020-06-09 ENCOUNTER — TELEMEDICINE (OUTPATIENT)
Dept: NEUROLOGY | Facility: CLINIC | Age: 35
End: 2020-06-09

## 2020-06-09 DIAGNOSIS — H53.9 VISUAL DISTURBANCE: ICD-10-CM

## 2020-06-09 DIAGNOSIS — G43.109 OCULAR MIGRAINE: ICD-10-CM

## 2020-06-09 DIAGNOSIS — R93.0 ABNORMAL MRI OF HEAD: ICD-10-CM

## 2020-06-09 PROCEDURE — 99213 OFFICE O/P EST LOW 20 MIN: CPT | Performed by: OTHER

## 2020-06-09 NOTE — PROGRESS NOTES
HPI:    Patient ID: Govind Callejas is a 30year old female. PCP: Dr Ananda Edouard      This visit is conducted using Telemedicine with live, interactive video and audio.     Patient understands and accepts financial responsibility for any deductible, co-insu not smoke cigarettes, occasional marijuana use. She is following with Cardiology for aortic regurgitation found on ECHO and 30 days holter monitoring is in process.  She was started on low dose Aspirin      Past medical history: as below  Past surgical hist Physical Exam    not currently breastfeeding. General: well developed, well nourished  HEENT: Normocephalic and atraumatic. normal sclera.  Moist mucus membrane  Cardiovascular:   not performed  Pulmonary/Chest: not performed  Abdominal: not performe

## 2020-06-16 ENCOUNTER — LAB ENCOUNTER (OUTPATIENT)
Dept: LAB | Facility: HOSPITAL | Age: 35
End: 2020-06-16
Attending: OPHTHALMOLOGY
Payer: COMMERCIAL

## 2020-06-16 ENCOUNTER — OFFICE VISIT (OUTPATIENT)
Dept: OBGYN CLINIC | Facility: CLINIC | Age: 35
End: 2020-06-16

## 2020-06-16 VITALS
HEART RATE: 77 BPM | SYSTOLIC BLOOD PRESSURE: 119 MMHG | DIASTOLIC BLOOD PRESSURE: 84 MMHG | WEIGHT: 226.38 LBS | BODY MASS INDEX: 40 KG/M2

## 2020-06-16 DIAGNOSIS — Z01.419 ENCOUNTER FOR GYNECOLOGICAL EXAMINATION WITHOUT ABNORMAL FINDING: Primary | ICD-10-CM

## 2020-06-16 DIAGNOSIS — H54.7 VISUAL LOSS: Primary | ICD-10-CM

## 2020-06-16 DIAGNOSIS — Z30.431 IUD CHECK UP: ICD-10-CM

## 2020-06-16 PROCEDURE — 86618 LYME DISEASE ANTIBODY: CPT

## 2020-06-16 PROCEDURE — 99395 PREV VISIT EST AGE 18-39: CPT | Performed by: OBSTETRICS & GYNECOLOGY

## 2020-06-16 PROCEDURE — 36415 COLL VENOUS BLD VENIPUNCTURE: CPT

## 2020-06-16 NOTE — PROGRESS NOTES
Well Woman Exam    HPI:  The patient is a 33yo female who presents today for annual exam. She got  in Oct. She was going to go to [facility] for her honeymoon (now canceled). She thinks they will go next year. Doing well with paragard IUD.  Newly diagn Occasionally      Drug use: Yes        Types: Cannabis        Comment: RARELY      Sexual activity: Yes        Birth control/protection: Paragard    Lifestyle      Physical activity:        Days per week: Not on file        Minutes per session: Not on file palpitations  Respiratory:  denies shortness of breath  Gastrointestinal:  denies nausea, vomiting, diarrhea and constipation and severe abdominal pain  Genitourinary:  denies dysuria, incontinence  Heme: Denies easy bruising   Skin/Breast:  Denies any douglas individual  2. Reviewed breast self awareness   4.  Follow up in 1 year

## 2020-07-29 ENCOUNTER — NURSE TRIAGE (OUTPATIENT)
Dept: FAMILY MEDICINE CLINIC | Facility: CLINIC | Age: 35
End: 2020-07-29

## 2020-07-29 NOTE — TELEPHONE ENCOUNTER
Action Requested: Summary for Provider     []  Critical Lab, Recommendations Needed  [] Need Additional Advice  []   FYI    []   Need Orders  [] Need Medications Sent to Pharmacy  []  Other     SUMMARY: Patient called with questions about COVID-19.   She wo

## 2020-09-21 ENCOUNTER — NURSE TRIAGE (OUTPATIENT)
Dept: FAMILY MEDICINE CLINIC | Facility: CLINIC | Age: 35
End: 2020-09-21

## 2020-09-21 ENCOUNTER — TELEMEDICINE (OUTPATIENT)
Dept: FAMILY MEDICINE CLINIC | Facility: CLINIC | Age: 35
End: 2020-09-21

## 2020-09-21 DIAGNOSIS — Z20.822 EXPOSURE TO COVID-19 VIRUS: Primary | ICD-10-CM

## 2020-09-21 PROCEDURE — 99213 OFFICE O/P EST LOW 20 MIN: CPT | Performed by: PHYSICIAN ASSISTANT

## 2020-09-21 NOTE — TELEPHONE ENCOUNTER
Action Requested: Summary for Provider     []  Critical Lab, Recommendations Needed  [] Need Additional Advice  []   FYI    []   Need Orders  [] Need Medications Sent to Pharmacy  []  Other     SUMMARY: Patient reports she was with her mom on Saturday--soc

## 2020-12-30 ENCOUNTER — LAB ENCOUNTER (OUTPATIENT)
Dept: LAB | Facility: HOSPITAL | Age: 35
End: 2020-12-30
Attending: PHYSICIAN ASSISTANT
Payer: COMMERCIAL

## 2020-12-30 ENCOUNTER — TELEMEDICINE (OUTPATIENT)
Dept: FAMILY MEDICINE CLINIC | Facility: CLINIC | Age: 35
End: 2020-12-30

## 2020-12-30 DIAGNOSIS — Z20.822 EXPOSURE TO COVID-19 VIRUS: Primary | ICD-10-CM

## 2020-12-30 DIAGNOSIS — Z20.822 EXPOSURE TO COVID-19 VIRUS: ICD-10-CM

## 2020-12-30 PROCEDURE — 99213 OFFICE O/P EST LOW 20 MIN: CPT | Performed by: PHYSICIAN ASSISTANT

## 2020-12-30 NOTE — PROGRESS NOTES
Please note that the following visit was completed using two-way, real-time interactive audio and/or video communication.   This has been done in good jabari to provide continuity of care in the best interest of the provider-patient relationship, due to the patient was made aware of where to find [facility] notice of privacy practices, telehealth consent form and other related consent forms and documents. which are located on the [facility] website.  The patient verbally agreed to telehealth consent form, related consent • Lacunar infarction ([facility] [address] 2/27/2020   • Moderate aortic regurgitation 2/27/2020   • Pulmonary embolism ([facility] [address] 2013    from OCP  Coumadin   • Transient homonymous hemianopia 2/27/2020         PHYSICAL EXAM:     Vitals signs taken at home if available:

## 2020-12-31 LAB — SARS-COV-2 RNA RESP QL NAA+PROBE: NOT DETECTED

## 2021-02-15 ENCOUNTER — TELEPHONE (OUTPATIENT)
Dept: OBGYN CLINIC | Facility: CLINIC | Age: 36
End: 2021-02-15

## 2021-02-15 NOTE — TELEPHONE ENCOUNTER
Assisted pt with scheduling appt with CAP for IUD removal on 5/3/2021 at 240pm at [facility]. Pt verbalized understanding.

## 2021-04-01 ENCOUNTER — HOSPITAL ENCOUNTER (OUTPATIENT)
Dept: CV DIAGNOSTICS | Facility: HOSPITAL | Age: 36
Discharge: HOME | End: 2021-04-01
Attending: INTERNAL MEDICINE
Payer: COMMERCIAL

## 2021-04-01 DIAGNOSIS — Q23.1 BICUSPID AORTIC VALVE: ICD-10-CM

## 2021-04-01 DIAGNOSIS — I35.1 NONRHEUMATIC AORTIC VALVE INSUFFICIENCY: ICD-10-CM

## 2021-04-01 PROCEDURE — 93306 TTE W/DOPPLER COMPLETE: CPT | Performed by: INTERNAL MEDICINE

## 2021-05-03 ENCOUNTER — OFFICE VISIT (OUTPATIENT)
Dept: OBGYN CLINIC | Facility: CLINIC | Age: 36
End: 2021-05-03

## 2021-05-03 VITALS
HEART RATE: 61 BPM | BODY MASS INDEX: 45 KG/M2 | SYSTOLIC BLOOD PRESSURE: 127 MMHG | DIASTOLIC BLOOD PRESSURE: 84 MMHG | WEIGHT: 254 LBS

## 2021-05-03 DIAGNOSIS — Z30.432 ENCOUNTER FOR REMOVAL OF INTRAUTERINE CONTRACEPTIVE DEVICE: Primary | ICD-10-CM

## 2021-05-03 PROCEDURE — 3079F DIAST BP 80-89 MM HG: CPT | Performed by: OBSTETRICS & GYNECOLOGY

## 2021-05-03 PROCEDURE — 58301 REMOVE INTRAUTERINE DEVICE: CPT | Performed by: OBSTETRICS & GYNECOLOGY

## 2021-05-03 PROCEDURE — 3074F SYST BP LT 130 MM HG: CPT | Performed by: OBSTETRICS & GYNECOLOGY

## 2021-05-10 ENCOUNTER — OFFICE VISIT (OUTPATIENT)
Dept: CARDIOLOGY CLINIC | Facility: CLINIC | Age: 36
End: 2021-05-10

## 2021-05-10 VITALS
SYSTOLIC BLOOD PRESSURE: 127 MMHG | WEIGHT: 255 LBS | HEIGHT: 63 IN | HEART RATE: 71 BPM | DIASTOLIC BLOOD PRESSURE: 86 MMHG | BODY MASS INDEX: 45.18 KG/M2

## 2021-05-10 DIAGNOSIS — I35.1 NONRHEUMATIC AORTIC VALVE INSUFFICIENCY: ICD-10-CM

## 2021-05-10 DIAGNOSIS — G45.3 AMAUROSIS FUGAX OF RIGHT EYE: ICD-10-CM

## 2021-05-10 DIAGNOSIS — I63.81 LACUNAR INFARCTION (HCC): ICD-10-CM

## 2021-05-10 DIAGNOSIS — Q23.1 BICUSPID AORTIC VALVE: Primary | ICD-10-CM

## 2021-05-10 PROCEDURE — 3008F BODY MASS INDEX DOCD: CPT | Performed by: INTERNAL MEDICINE

## 2021-05-10 PROCEDURE — 3074F SYST BP LT 130 MM HG: CPT | Performed by: INTERNAL MEDICINE

## 2021-05-10 PROCEDURE — 3079F DIAST BP 80-89 MM HG: CPT | Performed by: INTERNAL MEDICINE

## 2021-05-10 PROCEDURE — 99214 OFFICE O/P EST MOD 30 MIN: CPT | Performed by: INTERNAL MEDICINE

## 2021-05-10 NOTE — PATIENT INSTRUCTIONS
-Consult with cardiovascular surgery, in order to answer your questions about aortic valve surgery and what to expect.   -Consider consultation with a psychologist or therapist   -Repeat echocardiogram in 1 year and see Dr. Patt Arnett after that

## 2021-05-10 NOTE — PROGRESS NOTES
[facility]    Cardiac Electrophysiology Progress Note  5/10/2021      HPI:   Flex Fleming is a 29year old with bicuspid aortic valve, moderate AR, and normal LV structure and function.   She had crytpogenic stroke with transient visual loss and M gross motor deficits. LABS/DATA:     Echocardiogram 4/1/2021:  1. Left ventricle: The cavity size was normal. Wall thickness was      normal. Systolic function was normal. The estimated ejection      fraction was 60-65%, by biplane method of disks.  Timothy Hurtado

## 2021-08-09 ENCOUNTER — OFFICE VISIT (OUTPATIENT)
Dept: OBGYN CLINIC | Facility: CLINIC | Age: 36
End: 2021-08-09

## 2021-08-09 VITALS
SYSTOLIC BLOOD PRESSURE: 127 MMHG | BODY MASS INDEX: 46 KG/M2 | WEIGHT: 259.38 LBS | HEART RATE: 74 BPM | DIASTOLIC BLOOD PRESSURE: 82 MMHG

## 2021-08-09 DIAGNOSIS — Z01.419 ENCOUNTER FOR GYNECOLOGICAL EXAMINATION WITHOUT ABNORMAL FINDING: Primary | ICD-10-CM

## 2021-08-09 PROCEDURE — 3074F SYST BP LT 130 MM HG: CPT | Performed by: OBSTETRICS & GYNECOLOGY

## 2021-08-09 PROCEDURE — 3079F DIAST BP 80-89 MM HG: CPT | Performed by: OBSTETRICS & GYNECOLOGY

## 2021-08-09 PROCEDURE — 99395 PREV VISIT EST AGE 18-39: CPT | Performed by: OBSTETRICS & GYNECOLOGY

## 2021-08-09 NOTE — PROGRESS NOTES
Gloria Brito is a 29year old female  Patient's last menstrual period was 2021. Patient presents with:  Gyn Exam: ANNUAL EXAM   .     Her cycles are regular. She has no complaints.   IUD was removed in May and she has had 2 menses since MEDICATIONS:    Current Outpatient Medications:   •  aspirin 81 MG Oral Chew Tab, Chew 1 tablet (81 mg total) by mouth daily. , Disp: 365 tablet, Rfl: 0  •  PARAGARD INTRAUTERINE COPPER Intrauterine IUD, 1 (Patient not taking: Reported on 5/10/2021 ), lesions  Urethral Meatus:  normal in size, location, without lesions and prolapse  Bladder:  No fullness, masses or tenderness  Vagina:  Normal appearance without lesions, no abnormal discharge  Cervix:  Normal without tenderness on motion  Uterus: normal

## 2021-08-10 LAB — HPV I/H RISK 1 DNA SPEC QL NAA+PROBE: NEGATIVE

## 2021-08-13 LAB — LAST PAP RESULT: NORMAL

## 2022-01-11 ENCOUNTER — OFFICE VISIT (OUTPATIENT)
Dept: OBGYN CLINIC | Facility: CLINIC | Age: 37
End: 2022-01-11
Payer: COMMERCIAL

## 2022-01-11 ENCOUNTER — TELEPHONE (OUTPATIENT)
Dept: OBGYN CLINIC | Facility: CLINIC | Age: 37
End: 2022-01-11

## 2022-01-11 VITALS
BODY MASS INDEX: 45 KG/M2 | HEART RATE: 74 BPM | SYSTOLIC BLOOD PRESSURE: 128 MMHG | WEIGHT: 256.38 LBS | DIASTOLIC BLOOD PRESSURE: 87 MMHG

## 2022-01-11 DIAGNOSIS — Z31.69 PROCREATIVE MANAGEMENT COUNSELING: ICD-10-CM

## 2022-01-11 DIAGNOSIS — Z31.41 FERTILITY TESTING: Primary | ICD-10-CM

## 2022-01-11 PROCEDURE — 3079F DIAST BP 80-89 MM HG: CPT | Performed by: OBSTETRICS & GYNECOLOGY

## 2022-01-11 PROCEDURE — 3074F SYST BP LT 130 MM HG: CPT | Performed by: OBSTETRICS & GYNECOLOGY

## 2022-01-11 PROCEDURE — 99213 OFFICE O/P EST LOW 20 MIN: CPT | Performed by: OBSTETRICS & GYNECOLOGY

## 2022-01-11 NOTE — TELEPHONE ENCOUNTER
MyChart message. Should patient schedule with Dr. Win Hernandez or can she get referral to go to a specialist.    The soonest available appt is April, pt requesting February. Please advise    'Been trying to get pregnant since May 2021. I will be 40 in March 7 & my  will be 37. \"

## 2022-01-12 NOTE — PROGRESS NOTES
Nikolas Dsouza    3/24/1985       Patient presents with:  Consult: TRYING TO CONCEIVE  Patient is here to discuss infertility testing. She had her ParaGard IUD removed in May and has been having regular menses since.   She states that her and her  homonymous hemianopia 2020       History reviewed. No pertinent surgical history. Menstrual History:  OB History     T0    L0    SAB0  IAB0  Ectopic0  Multiple0  Live Births0      Patient's last menstrual period was 2021.

## 2022-01-17 ENCOUNTER — LAB ENCOUNTER (OUTPATIENT)
Dept: LAB | Facility: HOSPITAL | Age: 37
End: 2022-01-17
Attending: OBSTETRICS & GYNECOLOGY
Payer: COMMERCIAL

## 2022-01-17 DIAGNOSIS — Z31.41 FERTILITY TESTING: ICD-10-CM

## 2022-01-17 LAB
ESTRADIOL SERPL-MCNC: 183.6 PG/ML
FSH SERPL-ACNC: 6.1 MIU/ML
HCG SERPL QL: NEGATIVE
LH SERPL-ACNC: 2.6 MIU/ML
PROLACTIN SERPL-MCNC: 8.4 NG/ML
TSI SER-ACNC: 1.93 MIU/ML (ref 0.36–3.74)

## 2022-01-17 PROCEDURE — 84443 ASSAY THYROID STIM HORMONE: CPT

## 2022-01-17 PROCEDURE — 82670 ASSAY OF TOTAL ESTRADIOL: CPT

## 2022-01-17 PROCEDURE — 83002 ASSAY OF GONADOTROPIN (LH): CPT

## 2022-01-17 PROCEDURE — 84146 ASSAY OF PROLACTIN: CPT | Performed by: OBSTETRICS & GYNECOLOGY

## 2022-01-17 PROCEDURE — 83520 IMMUNOASSAY QUANT NOS NONAB: CPT

## 2022-01-17 PROCEDURE — 83001 ASSAY OF GONADOTROPIN (FSH): CPT

## 2022-01-17 PROCEDURE — 84703 CHORIONIC GONADOTROPIN ASSAY: CPT

## 2022-01-17 PROCEDURE — 36415 COLL VENOUS BLD VENIPUNCTURE: CPT

## 2022-01-20 LAB — ANTI-MULLERIAN HORMONE: 1.54 NG/ML

## 2022-02-16 ENCOUNTER — TELEPHONE (OUTPATIENT)
Dept: OBGYN CLINIC | Facility: CLINIC | Age: 37
End: 2022-02-16

## 2022-02-16 NOTE — TELEPHONE ENCOUNTER
Pt is calling to see were her  can go for a Sieman  Test  And if the DR can give him order due to he don't have a PCP ,

## 2022-02-18 NOTE — TELEPHONE ENCOUNTER
Pt states her  has a ppo and can go anywhere. He does not currently have a pcp. She is asking if it would be easier if he goes to an [address]. Pt informed we are not able to see 's chart, so it doesn't matter where he goes. We would still need results sent to us. If he doesn't have a pcp I can give phone number for our clinic. Pt states  She is driving but will look up pcp number when she get's home.

## 2022-02-18 NOTE — TELEPHONE ENCOUNTER
Maggy Chandra when pt calls back please notify pt her  can call his insurance on where he can go to establish PCP. Thank you.

## 2022-02-18 NOTE — TELEPHONE ENCOUNTER
Patient returned call requesting for recommendation where to go for semen analysis. Please call at [phone],[facility].

## 2022-05-02 ENCOUNTER — OFFICE VISIT (OUTPATIENT)
Dept: FAMILY MEDICINE CLINIC | Facility: CLINIC | Age: 37
End: 2022-05-02
Payer: COMMERCIAL

## 2022-05-02 VITALS
SYSTOLIC BLOOD PRESSURE: 131 MMHG | HEART RATE: 78 BPM | DIASTOLIC BLOOD PRESSURE: 80 MMHG | WEIGHT: 257 LBS | HEIGHT: 63 IN | BODY MASS INDEX: 45.54 KG/M2 | TEMPERATURE: 99 F

## 2022-05-02 DIAGNOSIS — J02.9 SORE THROAT: ICD-10-CM

## 2022-05-02 DIAGNOSIS — H66.92 LEFT ACUTE OTITIS MEDIA: Primary | ICD-10-CM

## 2022-05-02 LAB
CONTROL LINE PRESENT WITH A CLEAR BACKGROUND (YES/NO): YES YES/NO
KIT LOT #: NORMAL NUMERIC
STREP GRP A CUL-SCR: NEGATIVE

## 2022-05-02 RX ORDER — AZITHROMYCIN 250 MG/1
TABLET, FILM COATED ORAL
Qty: 6 TABLET | Refills: 0 | Status: SHIPPED | OUTPATIENT
Start: 2022-05-02 | End: 2022-05-07

## 2022-05-10 ENCOUNTER — HOSPITAL ENCOUNTER (OUTPATIENT)
Dept: CV DIAGNOSTICS | Facility: HOSPITAL | Age: 37
Discharge: HOME | End: 2022-05-10
Attending: INTERNAL MEDICINE
Payer: COMMERCIAL

## 2022-05-10 ENCOUNTER — TELEPHONE (OUTPATIENT)
Dept: OBGYN CLINIC | Facility: CLINIC | Age: 37
End: 2022-05-10

## 2022-05-10 DIAGNOSIS — I35.1 NONRHEUMATIC AORTIC VALVE INSUFFICIENCY: ICD-10-CM

## 2022-05-10 DIAGNOSIS — Q23.1 BICUSPID AORTIC VALVE: ICD-10-CM

## 2022-05-10 PROCEDURE — 93306 TTE W/DOPPLER COMPLETE: CPT | Performed by: INTERNAL MEDICINE

## 2022-05-10 NOTE — TELEPHONE ENCOUNTER
Patient requesting to speak with nurse regarding simen analysis completed this past Friday at [facility] and would like to discuss next steps for plan of care. Please call at [phone], or call at [phone],MANNIE.

## 2022-05-10 NOTE — TELEPHONE ENCOUNTER
Patient notified to have husbands results faxed to office for review. Notified her name needs to be somewhere on this report as well. Patient verbalized understanding.

## 2022-05-12 ENCOUNTER — PATIENT MESSAGE (OUTPATIENT)
Dept: OBGYN CLINIC | Facility: CLINIC | Age: 37
End: 2022-05-12

## 2022-05-12 NOTE — TELEPHONE ENCOUNTER
From: Tad Boss  To: Hoa Bell MD  Sent: 2022 10:11 AM CDT  Subject: Records Request from [employer],   I spoke to a nurse a couple of days ago, who informed me that my  needed to call the records office in order to have his semen analysis results faxed over to the [facility] and Gynecology department. His semen analysis was done through [facility] AND [facility]. He called the records office today and they informed him that your office needs to send the records office a records request on an official letter head form for his lab results. We are unsure of what do now. Please advise as we are trying to get Dr. Baljeet Saldaña to see his semen analysis results so that we can move forward in the next step. Thank you.  Tad Boss ( [birth year])

## 2022-05-20 ENCOUNTER — PATIENT MESSAGE (OUTPATIENT)
Dept: OBGYN CLINIC | Facility: CLINIC | Age: 37
End: 2022-05-20

## 2022-05-20 NOTE — TELEPHONE ENCOUNTER
From: Olga Patel  To: Miguel Bell MD  Sent: 2022 10:11 AM CDT  Subject: Records Request from [address],   I spoke to a nurse a couple of days ago, who informed me that my  needed to call the records office in order to have his semen analysis results faxed over to the [facility] and Gynecology department. His semen analysis was done through [facility] AND [facility]. He called the records office today and they informed him that your office needs to send the records office a records request on an official letter head form for his lab results. We are unsure of what do now. Please advise as we are trying to get Dr. Cari Boeck to see his semen analysis results so that we can move forward in the next step. Thank you.  Olga Patel ( [birth year])

## 2022-06-06 NOTE — TELEPHONE ENCOUNTER
Message to CAP. Please review SA for pt's . The results are in your inbox slot above your desk. Thanks.

## 2022-06-08 NOTE — TELEPHONE ENCOUNTER
Reviewed SA results and there is increased viscosity of the specimen for her - I am not sure if this is clinically significant or not- please refer to Dr Jo Ann Oakes or maribel  for consultation in urology. If SA is seen to be normal or if repeat is normal then next step is HSG- she will need to call on the first day of next menses to schedule on day 7-10 of cycle.

## 2022-06-09 ENCOUNTER — MED REC SCAN ONLY (OUTPATIENT)
Dept: OBGYN CLINIC | Facility: CLINIC | Age: 37
End: 2022-06-09

## 2022-06-27 ENCOUNTER — TELEPHONE (OUTPATIENT)
Dept: OBGYN CLINIC | Facility: CLINIC | Age: 37
End: 2022-06-27

## 2022-06-27 ENCOUNTER — PATIENT MESSAGE (OUTPATIENT)
Dept: OBGYN CLINIC | Facility: CLINIC | Age: 37
End: 2022-06-27

## 2022-06-27 DIAGNOSIS — O03.9 SAB (SPONTANEOUS ABORTION): Primary | ICD-10-CM

## 2022-06-27 NOTE — TELEPHONE ENCOUNTER
4w6d based on LMP of 5/24/2022 w/regular cycles. Pt accepts rotation with both males and female providers. Pt instructed to take OTC PNV with dha, folic acid and iron. Pt accepts PC OBN on 7/14. Pt indicates she is on bASA due to hx of DVT and PE, pt asking if ok to continue with medication? Pt informed message will be routed to [facility] on-call for recs. To REK to please review and advise. Thank you.

## 2022-06-29 ENCOUNTER — OFFICE VISIT (OUTPATIENT)
Dept: FAMILY MEDICINE CLINIC | Facility: CLINIC | Age: 37
End: 2022-06-29
Payer: COMMERCIAL

## 2022-06-29 ENCOUNTER — LAB ENCOUNTER (OUTPATIENT)
Dept: LAB | Age: 37
End: 2022-06-29
Attending: FAMILY MEDICINE
Payer: COMMERCIAL

## 2022-06-29 VITALS
HEIGHT: 63 IN | SYSTOLIC BLOOD PRESSURE: 130 MMHG | DIASTOLIC BLOOD PRESSURE: 85 MMHG | HEART RATE: 76 BPM | BODY MASS INDEX: 45.89 KG/M2 | WEIGHT: 259 LBS

## 2022-06-29 DIAGNOSIS — Z02.89 ENCOUNTER FOR PHYSICAL EXAMINATION RELATED TO EMPLOYMENT: ICD-10-CM

## 2022-06-29 DIAGNOSIS — N92.6 MISSED PERIOD: Primary | ICD-10-CM

## 2022-06-29 PROBLEM — I63.81 LACUNAR INFARCTION (HCC): Status: RESOLVED | Noted: 2020-02-27 | Resolved: 2022-06-29

## 2022-06-29 LAB
CONTROL LINE PRESENT WITH A CLEAR BACKGROUND (YES/NO): YES YES/NO
PREGNANCY TEST, URINE: POSITIVE

## 2022-06-29 PROCEDURE — 3079F DIAST BP 80-89 MM HG: CPT | Performed by: FAMILY MEDICINE

## 2022-06-29 PROCEDURE — 3008F BODY MASS INDEX DOCD: CPT | Performed by: FAMILY MEDICINE

## 2022-06-29 PROCEDURE — 86480 TB TEST CELL IMMUN MEASURE: CPT

## 2022-06-29 PROCEDURE — 81025 URINE PREGNANCY TEST: CPT | Performed by: FAMILY MEDICINE

## 2022-06-29 PROCEDURE — 3075F SYST BP GE 130 - 139MM HG: CPT | Performed by: FAMILY MEDICINE

## 2022-06-29 PROCEDURE — 36415 COLL VENOUS BLD VENIPUNCTURE: CPT

## 2022-06-29 PROCEDURE — 99214 OFFICE O/P EST MOD 30 MIN: CPT | Performed by: FAMILY MEDICINE

## 2022-07-01 LAB
M TB IFN-G CD4+ T-CELLS BLD-ACNC: 0.13 IU/ML
M TB TUBERC IFN-G BLD QL: NEGATIVE
M TB TUBERC IGNF/MITOGEN IGNF CONTROL: >10 IU/ML
QFT TB1 AG MINUS NIL: -0.03 IU/ML
QFT TB2 AG MINUS NIL: -0.03 IU/ML

## 2022-07-04 ENCOUNTER — HOSPITAL ENCOUNTER (EMERGENCY)
Facility: HOSPITAL | Age: 37
Discharge: HOME | End: 2022-07-04
Attending: EMERGENCY MEDICINE
Payer: COMMERCIAL

## 2022-07-04 ENCOUNTER — APPOINTMENT (OUTPATIENT)
Dept: ULTRASOUND IMAGING | Facility: HOSPITAL | Age: 37
End: 2022-07-04
Attending: EMERGENCY MEDICINE
Payer: COMMERCIAL

## 2022-07-04 VITALS
HEIGHT: 62 IN | OXYGEN SATURATION: 95 % | WEIGHT: 248 LBS | SYSTOLIC BLOOD PRESSURE: 130 MMHG | BODY MASS INDEX: 45.64 KG/M2 | RESPIRATION RATE: 16 BRPM | HEART RATE: 59 BPM | DIASTOLIC BLOOD PRESSURE: 83 MMHG | TEMPERATURE: 98 F

## 2022-07-04 DIAGNOSIS — O03.9 MISCARRIAGE: Primary | ICD-10-CM

## 2022-07-04 LAB
B-HCG SERPL-ACNC: 5 MIU/ML
BASOPHILS # BLD AUTO: 0.04 X10(3) UL (ref 0–0.2)
BASOPHILS NFR BLD AUTO: 0.5 %
DEPRECATED RDW RBC AUTO: 45.4 FL (ref 35.1–46.3)
EOSINOPHIL # BLD AUTO: 0.13 X10(3) UL (ref 0–0.7)
EOSINOPHIL NFR BLD AUTO: 1.8 %
ERYTHROCYTE [DISTWIDTH] IN BLOOD BY AUTOMATED COUNT: 13.4 % (ref 11–15)
HCT VFR BLD AUTO: 42.1 %
HGB BLD-MCNC: 13.1 G/DL
IMM GRANULOCYTES # BLD AUTO: 0.01 X10(3) UL (ref 0–1)
IMM GRANULOCYTES NFR BLD: 0.1 %
LYMPHOCYTES # BLD AUTO: 2.46 X10(3) UL (ref 1–4)
LYMPHOCYTES NFR BLD AUTO: 33.6 %
MCH RBC QN AUTO: 28.5 PG (ref 26–34)
MCHC RBC AUTO-ENTMCNC: 31.1 G/DL (ref 31–37)
MCV RBC AUTO: 91.7 FL
MONOCYTES # BLD AUTO: 0.65 X10(3) UL (ref 0.1–1)
MONOCYTES NFR BLD AUTO: 8.9 %
NEUTROPHILS # BLD AUTO: 4.03 X10 (3) UL (ref 1.5–7.7)
NEUTROPHILS # BLD AUTO: 4.03 X10(3) UL (ref 1.5–7.7)
NEUTROPHILS NFR BLD AUTO: 55.1 %
PLATELET # BLD AUTO: 411 10(3)UL (ref 150–450)
RBC # BLD AUTO: 4.59 X10(6)UL
RH BLOOD TYPE: POSITIVE
WBC # BLD AUTO: 7.3 X10(3) UL (ref 4–11)

## 2022-07-04 PROCEDURE — 86901 BLOOD TYPING SEROLOGIC RH(D): CPT | Performed by: EMERGENCY MEDICINE

## 2022-07-04 PROCEDURE — 84702 CHORIONIC GONADOTROPIN TEST: CPT | Performed by: EMERGENCY MEDICINE

## 2022-07-04 PROCEDURE — 76801 OB US < 14 WKS SINGLE FETUS: CPT | Performed by: EMERGENCY MEDICINE

## 2022-07-04 PROCEDURE — 85025 COMPLETE CBC W/AUTO DIFF WBC: CPT | Performed by: EMERGENCY MEDICINE

## 2022-07-04 PROCEDURE — 99284 EMERGENCY DEPT VISIT MOD MDM: CPT

## 2022-07-04 PROCEDURE — 86900 BLOOD TYPING SEROLOGIC ABO: CPT | Performed by: EMERGENCY MEDICINE

## 2022-07-04 PROCEDURE — 36415 COLL VENOUS BLD VENIPUNCTURE: CPT

## 2022-07-04 PROCEDURE — 51702 INSERT TEMP BLADDER CATH: CPT

## 2022-07-04 PROCEDURE — 76817 TRANSVAGINAL US OBSTETRIC: CPT | Performed by: EMERGENCY MEDICINE

## 2022-07-04 NOTE — ED INITIAL ASSESSMENT (HPI)
Pt arrives to ED with c/o being 6 weeks pregnant and having some vaginal bleeding since Saturday. Pt reports that yesterday cramping strated with increased bleeding. Pt states she wants to make sure everything is okay before she goes out of town.

## 2022-07-05 ENCOUNTER — LAB ENCOUNTER (OUTPATIENT)
Dept: LAB | Facility: HOSPITAL | Age: 37
End: 2022-07-05
Attending: OBSTETRICS & GYNECOLOGY
Payer: COMMERCIAL

## 2022-07-05 DIAGNOSIS — O03.9 SAB (SPONTANEOUS ABORTION): ICD-10-CM

## 2022-07-05 LAB — B-HCG SERPL-ACNC: 1 MIU/ML

## 2022-07-05 PROCEDURE — 84702 CHORIONIC GONADOTROPIN TEST: CPT

## 2022-07-05 PROCEDURE — 36415 COLL VENOUS BLD VENIPUNCTURE: CPT

## 2022-07-05 NOTE — TELEPHONE ENCOUNTER
Pt with early pregnancy. Pt sent a message on 7/3 stating she was having some light pink spotting with wiping. Pt sent a second message stating she developed cramping and the bleeding had increased. Pt did go to the ER on 7/4. Trygve Lips was 5.  US states gestational sac not identified on this exam.  Recs from ER were to repeat quant in 72 hours. Spoke with pt. She states she is still having some spotting with wiping. She did pass one clot over the weekend and noticed some \"blood particles\" in the toilet when she urinated. Pt also still having some cramping. Pt is leaving for [place] tomorrow morning. Pt wondering what, if anything, needs to be done prior to her leaving. Her last quant was on 7/4 at 5am.  Pt wondering if she should do the second recommended one tonight. PT also asked if there are restrictions on eating, drinking or swimming. Pt informed there are no restrictions on eating or drinking. Pt informed we will check with the doctor on swimming. Message to DEMARIO for recs on second [address] and swimming.

## 2022-07-05 NOTE — TELEPHONE ENCOUNTER
Spoke with DEMARIO over the phone. He said ok for quant today for reassurance of level dropping before [place] leaves the country. He also states ok for pt to swim. Quant order placed. Pt informed of recs. Pt will do quant this afternoon. Pt states prior to becoming pregnant spontaneously, pt was on track to start fertility treatment. Pt states CAP reviewed her 's SA and recommended he see a urologist before an [address] would be done. Pt's  has the appt with urology on 7/18. Pt is wondering if he should keep that appt. Pt would like some guidance on the net steps since she did become pregnant naturally. Pt advised we recommend waiting to try to conceive again until after quant is zero and she has a period. Message to CAP for recs on whether or not to continue with fertility workup - urologist for  and HSG for pt.

## 2022-07-22 ENCOUNTER — TELEPHONE (OUTPATIENT)
Dept: OBGYN UNIT | Facility: HOSPITAL | Age: 37
End: 2022-07-22

## 2022-09-13 ENCOUNTER — OFFICE VISIT (OUTPATIENT)
Dept: OBGYN CLINIC | Facility: CLINIC | Age: 37
End: 2022-09-13
Payer: COMMERCIAL

## 2022-09-13 VITALS
DIASTOLIC BLOOD PRESSURE: 84 MMHG | WEIGHT: 264.81 LBS | HEART RATE: 65 BPM | SYSTOLIC BLOOD PRESSURE: 143 MMHG | BODY MASS INDEX: 48 KG/M2

## 2022-09-13 DIAGNOSIS — Z87.59: ICD-10-CM

## 2022-09-13 DIAGNOSIS — Z01.419 ENCOUNTER FOR GYNECOLOGICAL EXAMINATION WITHOUT ABNORMAL FINDING: Primary | ICD-10-CM

## 2022-09-13 PROCEDURE — 99395 PREV VISIT EST AGE 18-39: CPT | Performed by: OBSTETRICS & GYNECOLOGY

## 2022-09-13 PROCEDURE — 3079F DIAST BP 80-89 MM HG: CPT | Performed by: OBSTETRICS & GYNECOLOGY

## 2022-09-13 PROCEDURE — 3077F SYST BP >= 140 MM HG: CPT | Performed by: OBSTETRICS & GYNECOLOGY

## 2023-01-25 ENCOUNTER — HOSPITAL ENCOUNTER (OUTPATIENT)
Dept: PERINATAL CARE | Facility: HOSPITAL | Age: 38
Discharge: HOME | End: 2023-01-25
Attending: OBSTETRICS & GYNECOLOGY
Payer: COMMERCIAL

## 2023-01-25 VITALS — WEIGHT: 264 LBS | HEIGHT: 63 IN | BODY MASS INDEX: 46.78 KG/M2

## 2023-01-25 DIAGNOSIS — Z86.711 HISTORY OF PULMONARY EMBOLISM: ICD-10-CM

## 2023-01-25 DIAGNOSIS — Z31.69 ENCOUNTER FOR PRECONCEPTION CONSULTATION: ICD-10-CM

## 2023-01-25 DIAGNOSIS — E66.01 MORBID OBESITY WITH BMI OF 45.0-49.9, ADULT (HCC): ICD-10-CM

## 2023-01-25 DIAGNOSIS — Q23.1 BICUSPID AORTIC VALVE: ICD-10-CM

## 2023-01-31 ENCOUNTER — OFFICE VISIT (OUTPATIENT)
Dept: OBGYN CLINIC | Facility: CLINIC | Age: 38
End: 2023-01-31

## 2023-01-31 VITALS
DIASTOLIC BLOOD PRESSURE: 91 MMHG | HEART RATE: 76 BPM | SYSTOLIC BLOOD PRESSURE: 144 MMHG | WEIGHT: 258 LBS | BODY MASS INDEX: 46 KG/M2

## 2023-01-31 DIAGNOSIS — N97.9 FEMALE INFERTILITY: Primary | ICD-10-CM

## 2023-01-31 PROCEDURE — 99212 OFFICE O/P EST SF 10 MIN: CPT | Performed by: OBSTETRICS & GYNECOLOGY

## 2023-01-31 PROCEDURE — 3080F DIAST BP >= 90 MM HG: CPT | Performed by: OBSTETRICS & GYNECOLOGY

## 2023-01-31 PROCEDURE — 3077F SYST BP >= 140 MM HG: CPT | Performed by: OBSTETRICS & GYNECOLOGY

## 2023-02-20 ENCOUNTER — TELEPHONE (OUTPATIENT)
Dept: OBGYN CLINIC | Facility: CLINIC | Age: 38
End: 2023-02-20

## 2023-02-20 NOTE — TELEPHONE ENCOUNTER
Pt reports lmp 1/20 or 1/21, 26-28 day cycles. Pt reports she had MAB last summer and since has found out she has a blood disorder. Pt does not know the name of the disorder. States she is taking BASA and high dose folic acid per infertility specialist. Pt conceived spontaneously. Pt has seen CAP and [facility]. Advised pt due to medical hx pt should see ob md first and then do OBN. Pt agrees and accepts appt on 2/22 in [facility]. Pt is taking PNV. Advised pt to bring medical records to her appt from infertility and a list of her meds and doses.

## 2023-02-21 ENCOUNTER — TELEPHONE (OUTPATIENT)
Dept: OBGYN CLINIC | Facility: CLINIC | Age: 38
End: 2023-02-21

## 2023-02-21 NOTE — TELEPHONE ENCOUNTER
Pt is having medical records from [address] faxed to office. Due to Pt history, needs to see Hemotologist because of blood clots, Dr. Malachi Barton told Pt to let office know as soon as possible when Pt became pregnant. . Pregnancy is confirmed. Pt had miscarraige in 7/2022. Has appointment with Dr. Malachi Barton on 2/22/23.

## 2023-02-21 NOTE — TELEPHONE ENCOUNTER
Received records from [facility] containing 27 pages of records. Placed in [address] [place] [address] office in [address] [place] [address] for appt tomorrow.

## 2023-02-22 ENCOUNTER — TELEPHONE (OUTPATIENT)
Dept: OBGYN CLINIC | Facility: CLINIC | Age: 38
End: 2023-02-22

## 2023-02-22 DIAGNOSIS — D68.59 THROMBOPHILIA (HCC): ICD-10-CM

## 2023-02-22 DIAGNOSIS — Z34.01 ENCOUNTER FOR SUPERVISION OF NORMAL FIRST PREGNANCY IN FIRST TRIMESTER: Primary | ICD-10-CM

## 2023-02-22 RX ORDER — ENOXAPARIN SODIUM 100 MG/ML
40 INJECTION SUBCUTANEOUS DAILY
Qty: 90 EACH | Refills: 2 | Status: SHIPPED | OUTPATIENT
Start: 2023-02-22

## 2023-02-22 RX ORDER — BLOOD PRESSURE TEST KIT
KIT MISCELLANEOUS
Qty: 100 EACH | Refills: 1 | Status: SHIPPED | OUTPATIENT
Start: 2023-02-22

## 2023-02-22 RX ORDER — SYRINGE WITH NEEDLE, 1 ML 28GX1/2"
SYRINGE, EMPTY DISPOSABLE MISCELLANEOUS
Qty: 1 EACH | Status: SHIPPED | OUTPATIENT
Start: 2023-02-22

## 2023-02-22 NOTE — TELEPHONE ENCOUNTER
Please send erx for lovenox 40mg every day and supplies. She will need instruction on use. Newly diagnosed pregnancy. Pt has h/o prev PE and has a thrombophilia per repro endo, Dr Blessing Ruiz- she will get further records for clarification.

## 2023-02-22 NOTE — TELEPHONE ENCOUNTER
Pt called and informed of Lovenox 40 mg every day, pt informed 90 day supply with 2 refills sent. Pt instructed will send over video for review on injection sites and technique. Pt informed it comes in prefilled syringes with needle attached. Pt informed to rotate sites. Pt instructed to call office with any questions or concerns, including bleeding gums, vaginal bleeding or increase in bruising. Pt states understanding.

## 2023-02-24 ENCOUNTER — LAB ENCOUNTER (OUTPATIENT)
Dept: LAB | Facility: HOSPITAL | Age: 38
End: 2023-02-24
Attending: OBSTETRICS & GYNECOLOGY
Payer: COMMERCIAL

## 2023-02-24 DIAGNOSIS — Z32.00 PREGNANCY EXAMINATION OR TEST, PREGNANCY UNCONFIRMED: ICD-10-CM

## 2023-02-24 LAB — HCG SERPL QL: POSITIVE

## 2023-02-24 PROCEDURE — 84703 CHORIONIC GONADOTROPIN ASSAY: CPT

## 2023-02-24 PROCEDURE — 36415 COLL VENOUS BLD VENIPUNCTURE: CPT

## 2023-03-01 ENCOUNTER — TELEPHONE (OUTPATIENT)
Dept: OBGYN CLINIC | Facility: CLINIC | Age: 38
End: 2023-03-01

## 2023-03-01 NOTE — TELEPHONE ENCOUNTER
Records received via fax from [facility] and placed above desk in front office. Pt has OBN appt on 3/11.

## 2023-03-11 ENCOUNTER — NURSE ONLY (OUTPATIENT)
Dept: OBGYN CLINIC | Facility: CLINIC | Age: 38
End: 2023-03-11

## 2023-03-11 DIAGNOSIS — Z34.91 FIRST TRIMESTER PREGNANCY: Primary | ICD-10-CM

## 2023-03-11 RX ORDER — CHOLECALCIFEROL (VITAMIN D3) 25 MCG
1 TABLET,CHEWABLE ORAL DAILY
COMMUNITY

## 2023-03-11 NOTE — PROGRESS NOTES
Pt seen for OBN-PC appt today with no complaints. Normal PN labs, 1 hr GTT labs ordered. Pt had qual done on 2/24. Pt advised all labs must be completed and resulted no later than a few days prior to MD appt to prevent cancelation. Pt accepted NPN appt on 3/24. Pt informed provider will order maternal EKG and sleep study. Pre- pregnancy weight: 255 lb  Current weight: unsure  Height: 5' 3\"    Requested Josafat Murguia RN to please place [facility] records be placed in [facility] box d/t working remote. Partner's name is Alberta Hernandez contact #[phone]; race: [place]  Pt's occupation:     MEDICAL HISTORY    Anemia No    Anesthetic complications No    Anxiety/Depression  No    Autoimmune Disorder No    Asthma  No    Cancer No    Diabetes  No    Gyne/breast Surgery No    Heart Disease No But Dx with Bicuspid valve 2 yrs ago. Sees cardiologist every year   Hepatitis/Liver Disease  No    History of blood transfusion No    History of abnormal pap No    Hypertension  No    Infertility  No    Kidney Disease/Frequent UTIs  No    Medication Allergies No    Latex Allergies No    Food Allergies  No    Neurological Disorder/Epilepsy No    Operations/Hospitalizations Yes Dx with a PE on 2/2013. Currently on Lovenox   TB exposure  No    Thyroid Dysfunction No    Trauma/Violence  No    Uterine Anomaly  No    Uterine Fibroids  No    Variocosities/DVTs No    Smoker No    Drug usage in prior year Yes Marijuana- gummies Socially. Stopped when found out was pregnant   Alcohol Yes Occasionally. Stopped when found out was pregnant     Would you accept a blood transfusion? If no, are you a Protestant?  Yes        INFECTION HISTORY    Chlamydia No    Pt or partner have hx of Genital Herpes No    Gonorrhea No    Hepatitis B No    HIV No    HPV No    MRSA No    Syphilis No    Tattoos No    Live with someone or Exposed to TB No    Rash or viral illness since LMP  No    Varicella Yes In childhood    Recent Travel (or planned travel) to [facility] for self and or partner No    Pets Yes A cat.  changes litter box       GENETICS SCREENING    Genetic Screening    Genetic Screening/Teratology Counseling- Includes patient, baby's father, or anyone in either family with:  Patient's age 28 years or older as of estimated date of delivery: Yes   Thalassemia ([facility] [place], [address], or  background): MCV less than 80: No   Neural tube defect (Meningomyelocele, Spina bifida, or Anencephaly): No   Congenital heart defect: No   Down syndrome: No   Alok-Sachs (Ashkenazi Yazidi, [place], [place]): No   Canavan disease (Ashkenazi Yazidi): No   Familial dysautonomia (Ashkenazi Yazidi): No   Sickle cell disease or trait (): No   Hemophilia or other blood disorders: No   Muscular dystrophy: No    Cystic fibrosis: No   Toney's chorea: No   Intellectual disability and/or autism: No   Other inherited genetic or chromosomal disorder: No   Maternal metabolic disorder (eg. Type 1 diabetes, PKU): No   Patient or baby's father had child with birth defects not listed above: No   Recurrent pregnancy loss, or a stillbirth: No   Medications (including supplements, vitamins, herbs, or OTC drugs)/illicit/recreational drugs/alcohol since last menstrual period: No               MISC    Infant vaccinations  Yes    Pt. Has answered NO 5P questions and has NO  risk factors. Pt. Given What pregnant women need to know handout.

## 2023-03-14 ENCOUNTER — LAB ENCOUNTER (OUTPATIENT)
Dept: LAB | Facility: HOSPITAL | Age: 38
End: 2023-03-14
Attending: OBSTETRICS & GYNECOLOGY
Payer: COMMERCIAL

## 2023-03-14 DIAGNOSIS — Z34.91 FIRST TRIMESTER PREGNANCY: ICD-10-CM

## 2023-03-14 LAB
ANTIBODY SCREEN: NEGATIVE
BASOPHILS # BLD AUTO: 0.05 X10(3) UL (ref 0–0.2)
BASOPHILS NFR BLD AUTO: 0.6 %
DEPRECATED RDW RBC AUTO: 47.8 FL (ref 35.1–46.3)
EOSINOPHIL # BLD AUTO: 0.07 X10(3) UL (ref 0–0.7)
EOSINOPHIL NFR BLD AUTO: 0.8 %
ERYTHROCYTE [DISTWIDTH] IN BLOOD BY AUTOMATED COUNT: 14.2 % (ref 11–15)
GLUCOSE 1H P GLC SERPL-MCNC: 83 MG/DL
HBV SURFACE AG SER-ACNC: <0.1 [IU]/L
HBV SURFACE AG SERPL QL IA: NONREACTIVE
HCT VFR BLD AUTO: 37.6 %
HCV AB SERPL QL IA: NONREACTIVE
HGB BLD-MCNC: 12 G/DL
IMM GRANULOCYTES # BLD AUTO: 0.02 X10(3) UL (ref 0–1)
IMM GRANULOCYTES NFR BLD: 0.2 %
LYMPHOCYTES # BLD AUTO: 2.23 X10(3) UL (ref 1–4)
LYMPHOCYTES NFR BLD AUTO: 26.9 %
MCH RBC QN AUTO: 29.2 PG (ref 26–34)
MCHC RBC AUTO-ENTMCNC: 31.9 G/DL (ref 31–37)
MCV RBC AUTO: 91.5 FL
MONOCYTES # BLD AUTO: 0.61 X10(3) UL (ref 0.1–1)
MONOCYTES NFR BLD AUTO: 7.3 %
NEUTROPHILS # BLD AUTO: 5.32 X10 (3) UL (ref 1.5–7.7)
NEUTROPHILS # BLD AUTO: 5.32 X10(3) UL (ref 1.5–7.7)
NEUTROPHILS NFR BLD AUTO: 64.2 %
PLATELET # BLD AUTO: 482 10(3)UL (ref 150–450)
RBC # BLD AUTO: 4.11 X10(6)UL
RH BLOOD TYPE: POSITIVE
RUBV IGG SER-ACNC: 7.4 IU/ML (ref 10–?)
WBC # BLD AUTO: 8.3 X10(3) UL (ref 4–11)

## 2023-03-14 PROCEDURE — 86803 HEPATITIS C AB TEST: CPT

## 2023-03-14 PROCEDURE — 86850 RBC ANTIBODY SCREEN: CPT

## 2023-03-14 PROCEDURE — 87389 HIV-1 AG W/HIV-1&-2 AB AG IA: CPT

## 2023-03-14 PROCEDURE — 87340 HEPATITIS B SURFACE AG IA: CPT

## 2023-03-14 PROCEDURE — 86780 TREPONEMA PALLIDUM: CPT

## 2023-03-14 PROCEDURE — 36415 COLL VENOUS BLD VENIPUNCTURE: CPT

## 2023-03-14 PROCEDURE — 86762 RUBELLA ANTIBODY: CPT

## 2023-03-14 PROCEDURE — 86900 BLOOD TYPING SEROLOGIC ABO: CPT

## 2023-03-14 PROCEDURE — 87086 URINE CULTURE/COLONY COUNT: CPT

## 2023-03-14 PROCEDURE — 86901 BLOOD TYPING SEROLOGIC RH(D): CPT

## 2023-03-14 PROCEDURE — 82950 GLUCOSE TEST: CPT

## 2023-03-14 PROCEDURE — 85025 COMPLETE CBC W/AUTO DIFF WBC: CPT

## 2023-03-15 ENCOUNTER — TELEPHONE (OUTPATIENT)
Dept: CASE MANAGEMENT | Facility: HOSPITAL | Age: 38
End: 2023-03-15

## 2023-03-15 LAB — T PALLIDUM AB SER QL: NEGATIVE

## 2023-03-24 ENCOUNTER — HOSPITAL ENCOUNTER (OUTPATIENT)
Dept: ULTRASOUND IMAGING | Facility: HOSPITAL | Age: 38
Discharge: HOME | End: 2023-03-24
Attending: NURSE PRACTITIONER
Payer: COMMERCIAL

## 2023-03-24 ENCOUNTER — TELEPHONE (OUTPATIENT)
Dept: OBGYN CLINIC | Facility: CLINIC | Age: 38
End: 2023-03-24

## 2023-03-24 ENCOUNTER — INITIAL PRENATAL (OUTPATIENT)
Dept: OBGYN CLINIC | Facility: CLINIC | Age: 38
End: 2023-03-24

## 2023-03-24 VITALS
DIASTOLIC BLOOD PRESSURE: 81 MMHG | WEIGHT: 262 LBS | SYSTOLIC BLOOD PRESSURE: 137 MMHG | BODY MASS INDEX: 46.42 KG/M2 | HEART RATE: 98 BPM | HEIGHT: 63 IN

## 2023-03-24 DIAGNOSIS — Z3A.08 8 WEEKS GESTATION OF PREGNANCY: ICD-10-CM

## 2023-03-24 DIAGNOSIS — Z15.89 HOMOZYGOUS MTHFR MUTATION C677T: ICD-10-CM

## 2023-03-24 DIAGNOSIS — O09.521 AMA (ADVANCED MATERNAL AGE) MULTIGRAVIDA 35+, FIRST TRIMESTER: ICD-10-CM

## 2023-03-24 DIAGNOSIS — R79.89 ELEVATED PLATELET COUNT: ICD-10-CM

## 2023-03-24 DIAGNOSIS — O99.210 OBESITY IN PREGNANCY: ICD-10-CM

## 2023-03-24 DIAGNOSIS — O09.521 AMA (ADVANCED MATERNAL AGE) MULTIGRAVIDA 35+, FIRST TRIMESTER: Primary | ICD-10-CM

## 2023-03-24 DIAGNOSIS — Z86.711 HISTORY OF PULMONARY EMBOLISM: ICD-10-CM

## 2023-03-24 DIAGNOSIS — O26.841 UTERINE SIZE DATE DISCREPANCY PREGNANCY, FIRST TRIMESTER: ICD-10-CM

## 2023-03-24 DIAGNOSIS — Q23.1 BICUSPID AORTIC VALVE: ICD-10-CM

## 2023-03-24 PROBLEM — Z28.39 RUBELLA NON-IMMUNE STATUS, ANTEPARTUM (HCC): Status: ACTIVE | Noted: 2023-03-24

## 2023-03-24 PROBLEM — O09.519 AMA (ADVANCED MATERNAL AGE) PRIMIGRAVIDA 35+ (HCC): Status: ACTIVE | Noted: 2023-03-24

## 2023-03-24 PROBLEM — O09.519 AMA (ADVANCED MATERNAL AGE) PRIMIGRAVIDA 35+: Status: ACTIVE | Noted: 2023-03-24

## 2023-03-24 PROBLEM — O09.899 RUBELLA NON-IMMUNE STATUS, ANTEPARTUM (HCC): Status: ACTIVE | Noted: 2023-03-24

## 2023-03-24 PROBLEM — Z28.39 RUBELLA NON-IMMUNE STATUS, ANTEPARTUM: Status: ACTIVE | Noted: 2023-03-24

## 2023-03-24 PROBLEM — O09.899 RUBELLA NON-IMMUNE STATUS, ANTEPARTUM: Status: ACTIVE | Noted: 2023-03-24

## 2023-03-24 LAB
APPEARANCE: CLEAR
BILIRUBIN: NEGATIVE
GLUCOSE (URINE DIPSTICK): NEGATIVE MG/DL
KETONES (URINE DIPSTICK): NEGATIVE MG/DL
LEUKOCYTES: NEGATIVE
MULTISTIX LOT#: NORMAL NUMERIC
NITRITE, URINE: NEGATIVE
OCCULT BLOOD: NEGATIVE
PH, URINE: 7.5 (ref 4.5–8)
SPECIFIC GRAVITY: 1 (ref 1–1.03)
URINE-COLOR: YELLOW
UROBILINOGEN,SEMI-QN: 0.2 MG/DL (ref 0–1.9)

## 2023-03-24 PROCEDURE — 3075F SYST BP GE 130 - 139MM HG: CPT | Performed by: NURSE PRACTITIONER

## 2023-03-24 PROCEDURE — 76801 OB US < 14 WKS SINGLE FETUS: CPT | Performed by: NURSE PRACTITIONER

## 2023-03-24 PROCEDURE — 3079F DIAST BP 80-89 MM HG: CPT | Performed by: NURSE PRACTITIONER

## 2023-03-24 PROCEDURE — 76815 OB US LIMITED FETUS(S): CPT | Performed by: NURSE PRACTITIONER

## 2023-03-24 PROCEDURE — 3008F BODY MASS INDEX DOCD: CPT | Performed by: NURSE PRACTITIONER

## 2023-03-24 PROCEDURE — 81002 URINALYSIS NONAUTO W/O SCOPE: CPT | Performed by: NURSE PRACTITIONER

## 2023-03-24 NOTE — PROGRESS NOTES
New OB. She had started with [facility] for female infertility work up. However, She reports spontaneous conception. Periods every 28 days. Some breast tenderness and mild nausea. No pain, no bleeding, no vomiting. Patient very anxious given hx of miscarriage. US today shows SLIUP, +yolk sac, CRL 7w6d/8w0d, +FCA. Will send for dating US to [facility]. Saw MFM preconception counseling in January. New OB labs reviewed - rubella equivocal, elevated platelets - repeat CBC in 1 month  Hx of PE in 2013, +RICHARD and MTHFR c677T homozygous with jail.   Will refer to [facility] for genetic testing and consult also for hx of PE, BMI 46, bicuspid valve, elevated platelets, AMA, +RICHARD and MTHFR  rev'd SAB precautions  She has appt with cardiology in May    rto 4 weeks

## 2023-03-24 NOTE — PROGRESS NOTES
TORB from EMB for hold and call ultrasound for size discrepancy. Pt scheduled for 330 pm main diagnostics. Pt informed of time, location and full bladder instructions given. Pt verbalizes understanding. EMB aware of time.

## 2023-03-24 NOTE — TELEPHONE ENCOUNTER
----- Message from MACRINA Gonzalez sent at 3/24/2023 11:44 AM CDT -----  Regarding: mfm genetics and consult  Patient desires genetic testing with M. Also needs consult due to multiple medical issues (see dx from visit) and will need level 2.     MACRINA Gonzalez

## 2023-03-27 PROBLEM — D21.9 FIBROID: Status: ACTIVE | Noted: 2023-03-27

## 2023-03-27 LAB
C TRACH DNA SPEC QL NAA+PROBE: NEGATIVE
N GONORRHOEA DNA SPEC QL NAA+PROBE: NEGATIVE
T VAGINALIS RRNA SPEC QL NAA+PROBE: NEGATIVE

## 2023-04-07 ENCOUNTER — OFFICE VISIT (OUTPATIENT)
Dept: HEMATOLOGY/ONCOLOGY | Facility: HOSPITAL | Age: 38
End: 2023-04-07
Attending: INTERNAL MEDICINE
Payer: COMMERCIAL

## 2023-04-07 ENCOUNTER — LAB ENCOUNTER (OUTPATIENT)
Dept: LAB | Facility: HOSPITAL | Age: 38
End: 2023-04-07
Attending: INTERNAL MEDICINE
Payer: COMMERCIAL

## 2023-04-07 VITALS
BODY MASS INDEX: 46.07 KG/M2 | OXYGEN SATURATION: 99 % | HEART RATE: 77 BPM | TEMPERATURE: 98 F | DIASTOLIC BLOOD PRESSURE: 71 MMHG | HEIGHT: 63 IN | SYSTOLIC BLOOD PRESSURE: 128 MMHG | RESPIRATION RATE: 16 BRPM | WEIGHT: 260 LBS

## 2023-04-07 DIAGNOSIS — Z86.711 HISTORY OF PULMONARY EMBOLISM: ICD-10-CM

## 2023-04-07 DIAGNOSIS — Z86.711 HISTORY OF PULMONARY EMBOLISM: Primary | ICD-10-CM

## 2023-04-07 DIAGNOSIS — D75.839 THROMBOCYTOSIS: ICD-10-CM

## 2023-04-07 DIAGNOSIS — Z79.01 CURRENT USE OF ANTICOAGULANT THERAPY: ICD-10-CM

## 2023-04-07 LAB
ALBUMIN SERPL-MCNC: 2.9 G/DL (ref 3.4–5)
ALBUMIN/GLOB SERPL: 0.6 {RATIO} (ref 1–2)
ALP LIVER SERPL-CCNC: 93 U/L
ALT SERPL-CCNC: 60 U/L
ANION GAP SERPL CALC-SCNC: 7 MMOL/L (ref 0–18)
AST SERPL-CCNC: 31 U/L (ref 15–37)
BILIRUB SERPL-MCNC: 0.2 MG/DL (ref 0.1–2)
BUN BLD-MCNC: 13 MG/DL (ref 7–18)
BUN/CREAT SERPL: 19.7 (ref 10–20)
CALCIUM BLD-MCNC: 9.7 MG/DL (ref 8.5–10.1)
CHLORIDE SERPL-SCNC: 103 MMOL/L (ref 98–112)
CO2 SERPL-SCNC: 27 MMOL/L (ref 21–32)
CREAT BLD-MCNC: 0.66 MG/DL
DEPRECATED HBV CORE AB SER IA-ACNC: 115.9 NG/ML
FASTING STATUS PATIENT QL REPORTED: NO
GFR SERPLBLD BASED ON 1.73 SQ M-ARVRAT: 116 ML/MIN/1.73M2 (ref 60–?)
GLOBULIN PLAS-MCNC: 4.8 G/DL (ref 2.8–4.4)
GLUCOSE BLD-MCNC: 95 MG/DL (ref 70–99)
IRON SATN MFR SERPL: 11 %
IRON SERPL-MCNC: 57 UG/DL
OSMOLALITY SERPL CALC.SUM OF ELEC: 284 MOSM/KG (ref 275–295)
POTASSIUM SERPL-SCNC: 3.6 MMOL/L (ref 3.5–5.1)
PROT SERPL-MCNC: 7.7 G/DL (ref 6.4–8.2)
SODIUM SERPL-SCNC: 137 MMOL/L (ref 136–145)
TIBC SERPL-MCNC: 520 UG/DL (ref 240–450)
TRANSFERRIN SERPL-MCNC: 349 MG/DL (ref 200–360)

## 2023-04-07 PROCEDURE — 85730 THROMBOPLASTIN TIME PARTIAL: CPT

## 2023-04-07 PROCEDURE — 36415 COLL VENOUS BLD VENIPUNCTURE: CPT

## 2023-04-07 PROCEDURE — 84466 ASSAY OF TRANSFERRIN: CPT

## 2023-04-07 PROCEDURE — 85598 HEXAGNAL PHOSPH PLTLT NEUTRL: CPT

## 2023-04-07 PROCEDURE — 85613 RUSSELL VIPER VENOM DILUTED: CPT

## 2023-04-07 PROCEDURE — 86146 BETA-2 GLYCOPROTEIN ANTIBODY: CPT

## 2023-04-07 PROCEDURE — 85610 PROTHROMBIN TIME: CPT

## 2023-04-07 PROCEDURE — 85390 FIBRINOLYSINS SCREEN I&R: CPT

## 2023-04-07 PROCEDURE — 83540 ASSAY OF IRON: CPT

## 2023-04-07 PROCEDURE — 82728 ASSAY OF FERRITIN: CPT

## 2023-04-07 PROCEDURE — 99244 OFF/OP CNSLTJ NEW/EST MOD 40: CPT | Performed by: INTERNAL MEDICINE

## 2023-04-07 PROCEDURE — 81240 F2 GENE: CPT

## 2023-04-07 PROCEDURE — 80053 COMPREHEN METABOLIC PANEL: CPT

## 2023-04-07 PROCEDURE — 86147 CARDIOLIPIN ANTIBODY EA IG: CPT

## 2023-04-10 LAB — F2 C.20210G>A GENO BLD/T: NORMAL

## 2023-04-11 LAB
APTT PPP: 29.5 SECONDS (ref 23.3–35.6)
CONFIRM APTT STACLOT: NEGATIVE
CONFIRM DRVVT: 1.1 S (ref 0–1.1)
PROTHROMBIN TIME: 12.3 SECONDS (ref 11.6–14.8)

## 2023-04-12 LAB
B2 GLYCOPROT1 IGG SERPL IA-ACNC: 30 U/ML (ref ?–7)
B2 GLYCOPROT1 IGM SERPL IA-ACNC: <2.4 U/ML (ref ?–7)
CARDIOLIPIN IGG SERPL-ACNC: 1.9 GPL (ref ?–10)
CARDIOLIPIN IGM SERPL-ACNC: 1.6 MPL (ref ?–10)

## 2023-04-19 ENCOUNTER — HOSPITAL ENCOUNTER (OUTPATIENT)
Dept: PERINATAL CARE | Facility: HOSPITAL | Age: 38
Discharge: HOME | End: 2023-04-19
Attending: OBSTETRICS & GYNECOLOGY
Payer: COMMERCIAL

## 2023-04-19 ENCOUNTER — HOSPITAL ENCOUNTER (OUTPATIENT)
Dept: PERINATAL CARE | Facility: HOSPITAL | Age: 38
Discharge: HOME | End: 2023-04-19
Attending: NURSE PRACTITIONER
Payer: COMMERCIAL

## 2023-04-19 VITALS
DIASTOLIC BLOOD PRESSURE: 85 MMHG | BODY MASS INDEX: 46 KG/M2 | WEIGHT: 260 LBS | SYSTOLIC BLOOD PRESSURE: 138 MMHG | HEART RATE: 76 BPM

## 2023-04-19 DIAGNOSIS — Z86.711 HISTORY OF PULMONARY EMBOLISM: ICD-10-CM

## 2023-04-19 DIAGNOSIS — Q23.1 BICUSPID AORTIC VALVE: ICD-10-CM

## 2023-04-19 DIAGNOSIS — Z36.9 FIRST TRIMESTER SCREENING: Primary | ICD-10-CM

## 2023-04-19 DIAGNOSIS — O99.210 OBESITY IN PREGNANCY: ICD-10-CM

## 2023-04-19 DIAGNOSIS — O09.511 PRIMIGRAVIDA OF ADVANCED MATERNAL AGE IN FIRST TRIMESTER: ICD-10-CM

## 2023-04-19 DIAGNOSIS — Z15.89 HOMOZYGOUS MTHFR MUTATION C677T: ICD-10-CM

## 2023-04-19 DIAGNOSIS — D21.9 FIBROID: ICD-10-CM

## 2023-04-19 DIAGNOSIS — Z36.9 FIRST TRIMESTER SCREENING: ICD-10-CM

## 2023-04-19 DIAGNOSIS — E66.01 MORBID OBESITY (HCC): ICD-10-CM

## 2023-04-19 PROCEDURE — 76813 OB US NUCHAL MEAS 1 GEST: CPT | Performed by: OBSTETRICS & GYNECOLOGY

## 2023-04-19 PROCEDURE — 36415 COLL VENOUS BLD VENIPUNCTURE: CPT

## 2023-04-21 ENCOUNTER — ROUTINE PRENATAL (OUTPATIENT)
Dept: OBGYN CLINIC | Facility: CLINIC | Age: 38
End: 2023-04-21

## 2023-04-21 VITALS
SYSTOLIC BLOOD PRESSURE: 137 MMHG | HEART RATE: 75 BPM | BODY MASS INDEX: 46 KG/M2 | WEIGHT: 260 LBS | DIASTOLIC BLOOD PRESSURE: 84 MMHG

## 2023-04-21 DIAGNOSIS — Z34.91 ENCOUNTER FOR SUPERVISION OF NORMAL PREGNANCY IN FIRST TRIMESTER, UNSPECIFIED GRAVIDITY: Primary | ICD-10-CM

## 2023-04-21 LAB
APPEARANCE: CLEAR
BILIRUBIN: NEGATIVE
GLUCOSE (URINE DIPSTICK): NEGATIVE MG/DL
KETONES (URINE DIPSTICK): NEGATIVE MG/DL
LEUKOCYTES: NEGATIVE
MULTISTIX LOT#: NORMAL NUMERIC
NITRITE, URINE: NEGATIVE
OCCULT BLOOD: NEGATIVE
PH, URINE: 7 (ref 4.5–8)
PROTEIN (URINE DIPSTICK): NEGATIVE MG/DL
SPECIFIC GRAVITY: 1.02 (ref 1–1.03)
URINE-COLOR: YELLOW
UROBILINOGEN,SEMI-QN: 0.2 MG/DL (ref 0–1.9)

## 2023-04-21 PROCEDURE — 3075F SYST BP GE 130 - 139MM HG: CPT | Performed by: OBSTETRICS & GYNECOLOGY

## 2023-04-21 PROCEDURE — 3079F DIAST BP 80-89 MM HG: CPT | Performed by: OBSTETRICS & GYNECOLOGY

## 2023-04-21 PROCEDURE — 81002 URINALYSIS NONAUTO W/O SCOPE: CPT | Performed by: OBSTETRICS & GYNECOLOGY

## 2023-04-26 ENCOUNTER — TELEPHONE (OUTPATIENT)
Dept: PERINATAL CARE | Facility: HOSPITAL | Age: 38
End: 2023-04-26

## 2023-04-26 NOTE — TELEPHONE ENCOUNTER
Panorama screening results obt  Reviewed by DR Sampson Peer    Results:  low risk  Low Risk for Aneuploidies, triploidy and Monosomy X  Fetal Sex: held for   Fetal Fraction: 3.9%      Pt states understanding  Copy of results sent for scanning into pt record

## 2023-05-04 ENCOUNTER — LAB ENCOUNTER (OUTPATIENT)
Dept: LAB | Facility: HOSPITAL | Age: 38
End: 2023-05-04
Attending: INTERNAL MEDICINE
Payer: COMMERCIAL

## 2023-05-04 DIAGNOSIS — Z79.01 CURRENT USE OF ANTICOAGULANT THERAPY: ICD-10-CM

## 2023-05-04 DIAGNOSIS — D75.839 THROMBOCYTOSIS: ICD-10-CM

## 2023-05-04 LAB
BASOPHILS # BLD AUTO: 0.03 X10(3) UL (ref 0–0.2)
BASOPHILS NFR BLD AUTO: 0.3 %
DEPRECATED HBV CORE AB SER IA-ACNC: 117.2 NG/ML
DEPRECATED RDW RBC AUTO: 44.7 FL (ref 35.1–46.3)
EOSINOPHIL # BLD AUTO: 0.07 X10(3) UL (ref 0–0.7)
EOSINOPHIL NFR BLD AUTO: 0.7 %
ERYTHROCYTE [DISTWIDTH] IN BLOOD BY AUTOMATED COUNT: 13.5 % (ref 11–15)
HCT VFR BLD AUTO: 35.7 %
HGB BLD-MCNC: 11.5 G/DL
IMM GRANULOCYTES # BLD AUTO: 0.02 X10(3) UL (ref 0–1)
IMM GRANULOCYTES NFR BLD: 0.2 %
IRON SATN MFR SERPL: 10 %
IRON SERPL-MCNC: 46 UG/DL
LYMPHOCYTES # BLD AUTO: 2.3 X10(3) UL (ref 1–4)
LYMPHOCYTES NFR BLD AUTO: 24.1 %
MCH RBC QN AUTO: 29.1 PG (ref 26–34)
MCHC RBC AUTO-ENTMCNC: 32.2 G/DL (ref 31–37)
MCV RBC AUTO: 90.4 FL
MONOCYTES # BLD AUTO: 0.68 X10(3) UL (ref 0.1–1)
MONOCYTES NFR BLD AUTO: 7.1 %
NEUTROPHILS # BLD AUTO: 6.43 X10 (3) UL (ref 1.5–7.7)
NEUTROPHILS # BLD AUTO: 6.43 X10(3) UL (ref 1.5–7.7)
NEUTROPHILS NFR BLD AUTO: 67.6 %
PLATELET # BLD AUTO: 435 10(3)UL (ref 150–450)
RBC # BLD AUTO: 3.95 X10(6)UL
TIBC SERPL-MCNC: 483 UG/DL (ref 240–450)
TRANSFERRIN SERPL-MCNC: 324 MG/DL (ref 200–360)
WBC # BLD AUTO: 9.5 X10(3) UL (ref 4–11)

## 2023-05-04 PROCEDURE — 82728 ASSAY OF FERRITIN: CPT

## 2023-05-04 PROCEDURE — 36415 COLL VENOUS BLD VENIPUNCTURE: CPT

## 2023-05-04 PROCEDURE — 84466 ASSAY OF TRANSFERRIN: CPT

## 2023-05-04 PROCEDURE — 83540 ASSAY OF IRON: CPT

## 2023-05-04 PROCEDURE — 85025 COMPLETE CBC W/AUTO DIFF WBC: CPT

## 2023-05-05 ENCOUNTER — OFFICE VISIT (OUTPATIENT)
Dept: HEMATOLOGY/ONCOLOGY | Facility: HOSPITAL | Age: 38
End: 2023-05-05
Attending: INTERNAL MEDICINE
Payer: COMMERCIAL

## 2023-05-05 VITALS
WEIGHT: 260 LBS | RESPIRATION RATE: 16 BRPM | DIASTOLIC BLOOD PRESSURE: 68 MMHG | BODY MASS INDEX: 46.07 KG/M2 | TEMPERATURE: 98 F | HEIGHT: 63 IN | HEART RATE: 100 BPM | SYSTOLIC BLOOD PRESSURE: 125 MMHG | OXYGEN SATURATION: 95 %

## 2023-05-05 DIAGNOSIS — Z79.01 CURRENT USE OF ANTICOAGULANT THERAPY: ICD-10-CM

## 2023-05-05 DIAGNOSIS — Z86.711 HISTORY OF PULMONARY EMBOLISM: Primary | ICD-10-CM

## 2023-05-05 PROCEDURE — 99214 OFFICE O/P EST MOD 30 MIN: CPT | Performed by: INTERNAL MEDICINE

## 2023-05-05 RX ORDER — ENOXAPARIN SODIUM 100 MG/ML
40 INJECTION SUBCUTANEOUS DAILY
Qty: 90 EACH | Refills: 2 | Status: SHIPPED | OUTPATIENT
Start: 2023-05-05

## 2023-05-05 RX ORDER — ENOXAPARIN SODIUM 150 MG/ML
1 INJECTION SUBCUTANEOUS DAILY
Qty: 30 EACH | Refills: 3 | Status: SHIPPED | OUTPATIENT
Start: 2023-05-05 | End: 2023-05-05 | Stop reason: CLARIF

## 2023-05-19 ENCOUNTER — ROUTINE PRENATAL (OUTPATIENT)
Dept: OBGYN CLINIC | Facility: CLINIC | Age: 38
End: 2023-05-19

## 2023-05-19 VITALS
SYSTOLIC BLOOD PRESSURE: 123 MMHG | DIASTOLIC BLOOD PRESSURE: 79 MMHG | BODY MASS INDEX: 47 KG/M2 | HEART RATE: 87 BPM | WEIGHT: 263.38 LBS

## 2023-05-19 DIAGNOSIS — Z34.91 ENCOUNTER FOR SUPERVISION OF NORMAL PREGNANCY IN FIRST TRIMESTER, UNSPECIFIED GRAVIDITY: Primary | ICD-10-CM

## 2023-05-19 LAB
APPEARANCE: CLEAR
BILIRUBIN: NEGATIVE
GLUCOSE (URINE DIPSTICK): NEGATIVE MG/DL
KETONES (URINE DIPSTICK): NEGATIVE MG/DL
MULTISTIX LOT#: ABNORMAL NUMERIC
NITRITE, URINE: NEGATIVE
OCCULT BLOOD: NEGATIVE
PH, URINE: 7.5 (ref 4.5–8)
PROTEIN (URINE DIPSTICK): NEGATIVE MG/DL
SPECIFIC GRAVITY: 1 (ref 1–1.03)
URINE-COLOR: YELLOW
UROBILINOGEN,SEMI-QN: 0.2 MG/DL (ref 0–1.9)

## 2023-05-19 PROCEDURE — 81002 URINALYSIS NONAUTO W/O SCOPE: CPT | Performed by: OBSTETRICS & GYNECOLOGY

## 2023-05-19 PROCEDURE — 3074F SYST BP LT 130 MM HG: CPT | Performed by: OBSTETRICS & GYNECOLOGY

## 2023-05-19 PROCEDURE — 3078F DIAST BP <80 MM HG: CPT | Performed by: OBSTETRICS & GYNECOLOGY

## 2023-06-14 ENCOUNTER — HOSPITAL ENCOUNTER (OUTPATIENT)
Dept: PERINATAL CARE | Facility: HOSPITAL | Age: 38
Discharge: HOME | End: 2023-06-14
Attending: OBSTETRICS & GYNECOLOGY
Payer: COMMERCIAL

## 2023-06-14 VITALS
BODY MASS INDEX: 47 KG/M2 | HEART RATE: 108 BPM | DIASTOLIC BLOOD PRESSURE: 78 MMHG | WEIGHT: 267 LBS | SYSTOLIC BLOOD PRESSURE: 112 MMHG

## 2023-06-14 DIAGNOSIS — Z15.89 HOMOZYGOUS MTHFR MUTATION C677T: ICD-10-CM

## 2023-06-14 DIAGNOSIS — O09.512 PRIMIGRAVIDA OF ADVANCED MATERNAL AGE IN SECOND TRIMESTER: Primary | ICD-10-CM

## 2023-06-14 DIAGNOSIS — Q23.1 BICUSPID AORTIC VALVE: ICD-10-CM

## 2023-06-14 DIAGNOSIS — Z86.711 HISTORY OF PULMONARY EMBOLISM: ICD-10-CM

## 2023-06-14 DIAGNOSIS — O99.210 OBESITY IN PREGNANCY: ICD-10-CM

## 2023-06-14 DIAGNOSIS — E66.01 MORBID OBESITY (HCC): ICD-10-CM

## 2023-06-14 DIAGNOSIS — D21.9 FIBROID: ICD-10-CM

## 2023-06-14 DIAGNOSIS — O09.512 PRIMIGRAVIDA OF ADVANCED MATERNAL AGE IN SECOND TRIMESTER: ICD-10-CM

## 2023-06-14 PROCEDURE — 76811 OB US DETAILED SNGL FETUS: CPT | Performed by: OBSTETRICS & GYNECOLOGY

## 2023-06-15 ENCOUNTER — LAB ENCOUNTER (OUTPATIENT)
Dept: LAB | Age: 38
End: 2023-06-15
Attending: INTERNAL MEDICINE
Payer: COMMERCIAL

## 2023-06-15 DIAGNOSIS — Z79.01 CURRENT USE OF ANTICOAGULANT THERAPY: ICD-10-CM

## 2023-06-15 DIAGNOSIS — Z86.711 HISTORY OF PULMONARY EMBOLISM: ICD-10-CM

## 2023-06-15 LAB
BASOPHILS # BLD AUTO: 0.03 X10(3) UL (ref 0–0.2)
BASOPHILS NFR BLD AUTO: 0.3 %
EOSINOPHIL # BLD AUTO: 0.07 X10(3) UL (ref 0–0.7)
EOSINOPHIL NFR BLD AUTO: 0.7 %
ERYTHROCYTE [DISTWIDTH] IN BLOOD BY AUTOMATED COUNT: 14.2 %
HCT VFR BLD AUTO: 36.1 %
HGB BLD-MCNC: 11.1 G/DL
IMM GRANULOCYTES # BLD AUTO: 0.06 X10(3) UL (ref 0–1)
IMM GRANULOCYTES NFR BLD: 0.6 %
LYMPHOCYTES # BLD AUTO: 1.95 X10(3) UL (ref 1–4)
LYMPHOCYTES NFR BLD AUTO: 18.9 %
MCH RBC QN AUTO: 29.1 PG (ref 26–34)
MCHC RBC AUTO-ENTMCNC: 30.7 G/DL (ref 31–37)
MCV RBC AUTO: 94.5 FL
MONOCYTES # BLD AUTO: 0.58 X10(3) UL (ref 0.1–1)
MONOCYTES NFR BLD AUTO: 5.6 %
NEUTROPHILS # BLD AUTO: 7.65 X10 (3) UL (ref 1.5–7.7)
NEUTROPHILS # BLD AUTO: 7.65 X10(3) UL (ref 1.5–7.7)
NEUTROPHILS NFR BLD AUTO: 73.9 %
PLATELET # BLD AUTO: 518 10(3)UL (ref 150–450)
RBC # BLD AUTO: 3.82 X10(6)UL
WBC # BLD AUTO: 10.3 X10(3) UL (ref 4–11)

## 2023-06-15 PROCEDURE — 85025 COMPLETE CBC W/AUTO DIFF WBC: CPT

## 2023-06-16 ENCOUNTER — OFFICE VISIT (OUTPATIENT)
Dept: HEMATOLOGY/ONCOLOGY | Facility: HOSPITAL | Age: 38
End: 2023-06-16
Attending: INTERNAL MEDICINE
Payer: COMMERCIAL

## 2023-06-16 ENCOUNTER — ROUTINE PRENATAL (OUTPATIENT)
Dept: OBGYN CLINIC | Facility: CLINIC | Age: 38
End: 2023-06-16

## 2023-06-16 ENCOUNTER — LAB ENCOUNTER (OUTPATIENT)
Dept: LAB | Facility: HOSPITAL | Age: 38
End: 2023-06-16
Attending: INTERNAL MEDICINE
Payer: COMMERCIAL

## 2023-06-16 VITALS
SYSTOLIC BLOOD PRESSURE: 121 MMHG | WEIGHT: 267 LBS | BODY MASS INDEX: 47.31 KG/M2 | HEIGHT: 63 IN | DIASTOLIC BLOOD PRESSURE: 73 MMHG | TEMPERATURE: 98 F | OXYGEN SATURATION: 96 % | HEART RATE: 81 BPM | RESPIRATION RATE: 16 BRPM

## 2023-06-16 VITALS
HEART RATE: 101 BPM | BODY MASS INDEX: 48 KG/M2 | SYSTOLIC BLOOD PRESSURE: 118 MMHG | DIASTOLIC BLOOD PRESSURE: 81 MMHG | WEIGHT: 268.81 LBS

## 2023-06-16 DIAGNOSIS — D75.839 THROMBOCYTOSIS: Primary | ICD-10-CM

## 2023-06-16 DIAGNOSIS — Z34.80 ENCOUNTER FOR SUPERVISION OF OTHER NORMAL PREGNANCY, UNSPECIFIED TRIMESTER: Primary | ICD-10-CM

## 2023-06-16 DIAGNOSIS — D75.839 THROMBOCYTOSIS: ICD-10-CM

## 2023-06-16 DIAGNOSIS — Z79.01 CURRENT USE OF ANTICOAGULANT THERAPY: ICD-10-CM

## 2023-06-16 DIAGNOSIS — Z86.711 HISTORY OF PULMONARY EMBOLISM: ICD-10-CM

## 2023-06-16 PROBLEM — Z00.00 ROUTINE PHYSICAL EXAMINATION: Status: RESOLVED | Noted: 2018-01-23 | Resolved: 2023-06-16

## 2023-06-16 LAB
APPEARANCE: CLEAR
BILIRUBIN: NEGATIVE
DEPRECATED HBV CORE AB SER IA-ACNC: 97.1 NG/ML
GLUCOSE (URINE DIPSTICK): NEGATIVE MG/DL
IRON SATN MFR SERPL: 10 %
IRON SERPL-MCNC: 61 UG/DL
KETONES (URINE DIPSTICK): NEGATIVE MG/DL
MULTISTIX LOT#: ABNORMAL NUMERIC
NITRITE, URINE: NEGATIVE
OCCULT BLOOD: NEGATIVE
PH, URINE: 5 (ref 4.5–8)
SPECIFIC GRAVITY: 1.02 (ref 1–1.03)
TIBC SERPL-MCNC: 581 UG/DL (ref 240–450)
TRANSFERRIN SERPL-MCNC: 390 MG/DL (ref 200–360)
URINE-COLOR: YELLOW
UROBILINOGEN,SEMI-QN: 0.2 MG/DL (ref 0–1.9)

## 2023-06-16 PROCEDURE — 3079F DIAST BP 80-89 MM HG: CPT | Performed by: OBSTETRICS & GYNECOLOGY

## 2023-06-16 PROCEDURE — 3074F SYST BP LT 130 MM HG: CPT | Performed by: OBSTETRICS & GYNECOLOGY

## 2023-06-16 PROCEDURE — 36415 COLL VENOUS BLD VENIPUNCTURE: CPT

## 2023-06-16 PROCEDURE — 83540 ASSAY OF IRON: CPT

## 2023-06-16 PROCEDURE — 81002 URINALYSIS NONAUTO W/O SCOPE: CPT | Performed by: OBSTETRICS & GYNECOLOGY

## 2023-06-16 PROCEDURE — 99214 OFFICE O/P EST MOD 30 MIN: CPT | Performed by: INTERNAL MEDICINE

## 2023-06-16 PROCEDURE — 82728 ASSAY OF FERRITIN: CPT

## 2023-06-16 PROCEDURE — 84466 ASSAY OF TRANSFERRIN: CPT

## 2023-06-16 RX ORDER — ENOXAPARIN SODIUM 100 MG/ML
40 INJECTION SUBCUTANEOUS DAILY
Qty: 30 EACH | Refills: 2 | Status: SHIPPED | OUTPATIENT
Start: 2023-06-16

## 2023-06-16 NOTE — PROGRESS NOTES
Doing well. Remains on 40 Lovenox daily, will be increased to 60 daily at end of 2nd trimester per Dr. Covington Current. Pt states that cardiology cleared her for a vaginal delivery. Has fetal echo at 24 weeks.  RTC 4 wks

## 2023-07-14 ENCOUNTER — ROUTINE PRENATAL (OUTPATIENT)
Dept: OBGYN CLINIC | Facility: CLINIC | Age: 38
End: 2023-07-14

## 2023-07-14 VITALS
DIASTOLIC BLOOD PRESSURE: 79 MMHG | WEIGHT: 266.63 LBS | BODY MASS INDEX: 47 KG/M2 | HEART RATE: 94 BPM | SYSTOLIC BLOOD PRESSURE: 115 MMHG

## 2023-07-14 DIAGNOSIS — Z34.92 ENCOUNTER FOR SUPERVISION OF NORMAL PREGNANCY IN SECOND TRIMESTER, UNSPECIFIED GRAVIDITY: Primary | ICD-10-CM

## 2023-07-14 LAB
APPEARANCE: CLEAR
BILIRUBIN: NEGATIVE
GLUCOSE (URINE DIPSTICK): NEGATIVE MG/DL
KETONES (URINE DIPSTICK): NEGATIVE MG/DL
LEUKOCYTES: NEGATIVE
MULTISTIX LOT#: NORMAL NUMERIC
NITRITE, URINE: NEGATIVE
OCCULT BLOOD: NEGATIVE
PH, URINE: 7 (ref 4.5–8)
SPECIFIC GRAVITY: 1.01 (ref 1–1.03)
URINE-COLOR: YELLOW
UROBILINOGEN,SEMI-QN: 0.2 MG/DL (ref 0–1.9)

## 2023-07-14 PROCEDURE — 3074F SYST BP LT 130 MM HG: CPT | Performed by: OBSTETRICS & GYNECOLOGY

## 2023-07-14 PROCEDURE — 81002 URINALYSIS NONAUTO W/O SCOPE: CPT | Performed by: OBSTETRICS & GYNECOLOGY

## 2023-07-14 PROCEDURE — 3078F DIAST BP <80 MM HG: CPT | Performed by: OBSTETRICS & GYNECOLOGY

## 2023-07-14 RX ORDER — MELATONIN
325
COMMUNITY

## 2023-07-18 ENCOUNTER — HOSPITAL ENCOUNTER (OUTPATIENT)
Dept: PERINATAL CARE | Facility: HOSPITAL | Age: 38
Discharge: HOME | End: 2023-07-18
Attending: OBSTETRICS & GYNECOLOGY
Payer: COMMERCIAL

## 2023-07-18 VITALS
DIASTOLIC BLOOD PRESSURE: 75 MMHG | HEART RATE: 90 BPM | SYSTOLIC BLOOD PRESSURE: 111 MMHG | WEIGHT: 266 LBS | BODY MASS INDEX: 47 KG/M2

## 2023-07-18 DIAGNOSIS — Z82.79 FAMILY HISTORY OF CONGENITAL HEART DEFECT: ICD-10-CM

## 2023-07-18 DIAGNOSIS — Z86.711 HISTORY OF PULMONARY EMBOLISM: ICD-10-CM

## 2023-07-18 DIAGNOSIS — Q23.1 BICUSPID AORTIC VALVE: Primary | ICD-10-CM

## 2023-07-18 DIAGNOSIS — O99.210 OBESITY IN PREGNANCY: ICD-10-CM

## 2023-07-18 DIAGNOSIS — Q23.1 BICUSPID AORTIC VALVE: ICD-10-CM

## 2023-07-18 DIAGNOSIS — O09.512 PRIMIGRAVIDA OF ADVANCED MATERNAL AGE IN SECOND TRIMESTER: ICD-10-CM

## 2023-07-18 PROCEDURE — 76825 ECHO EXAM OF FETAL HEART: CPT | Performed by: OBSTETRICS & GYNECOLOGY

## 2023-07-18 PROCEDURE — 76827 ECHO EXAM OF FETAL HEART: CPT

## 2023-07-18 PROCEDURE — 93325 DOPPLER ECHO COLOR FLOW MAPG: CPT

## 2023-07-20 ENCOUNTER — LAB ENCOUNTER (OUTPATIENT)
Dept: LAB | Facility: HOSPITAL | Age: 38
End: 2023-07-20
Attending: OBSTETRICS & GYNECOLOGY
Payer: COMMERCIAL

## 2023-07-20 DIAGNOSIS — Z34.92 ENCOUNTER FOR SUPERVISION OF NORMAL PREGNANCY IN SECOND TRIMESTER, UNSPECIFIED GRAVIDITY: ICD-10-CM

## 2023-07-20 DIAGNOSIS — D75.839 THROMBOCYTOSIS: ICD-10-CM

## 2023-07-20 LAB
BASOPHILS # BLD AUTO: 0.04 X10(3) UL (ref 0–0.2)
BASOPHILS NFR BLD AUTO: 0.5 %
DEPRECATED HBV CORE AB SER IA-ACNC: 71.7 NG/ML
DEPRECATED RDW RBC AUTO: 46.4 FL (ref 35.1–46.3)
EOSINOPHIL # BLD AUTO: 0.1 X10(3) UL (ref 0–0.7)
EOSINOPHIL NFR BLD AUTO: 1.2 %
ERYTHROCYTE [DISTWIDTH] IN BLOOD BY AUTOMATED COUNT: 13.9 % (ref 11–15)
GLUCOSE 1H P GLC SERPL-MCNC: 126 MG/DL
HCT VFR BLD AUTO: 31.8 %
HGB BLD-MCNC: 10.1 G/DL
IMM GRANULOCYTES # BLD AUTO: 0.04 X10(3) UL (ref 0–1)
IMM GRANULOCYTES NFR BLD: 0.5 %
IRON SATN MFR SERPL: 10 %
IRON SERPL-MCNC: 56 UG/DL
LYMPHOCYTES # BLD AUTO: 1.65 X10(3) UL (ref 1–4)
LYMPHOCYTES NFR BLD AUTO: 19.1 %
MCH RBC QN AUTO: 29.4 PG (ref 26–34)
MCHC RBC AUTO-ENTMCNC: 31.8 G/DL (ref 31–37)
MCV RBC AUTO: 92.4 FL
MONOCYTES # BLD AUTO: 0.43 X10(3) UL (ref 0.1–1)
MONOCYTES NFR BLD AUTO: 5 %
NEUTROPHILS # BLD AUTO: 6.4 X10 (3) UL (ref 1.5–7.7)
NEUTROPHILS # BLD AUTO: 6.4 X10(3) UL (ref 1.5–7.7)
NEUTROPHILS NFR BLD AUTO: 73.7 %
PLATELET # BLD AUTO: 474 10(3)UL (ref 150–450)
RBC # BLD AUTO: 3.44 X10(6)UL
TIBC SERPL-MCNC: 583 UG/DL (ref 240–450)
TRANSFERRIN SERPL-MCNC: 391 MG/DL (ref 200–360)
WBC # BLD AUTO: 8.7 X10(3) UL (ref 4–11)

## 2023-07-20 PROCEDURE — 82728 ASSAY OF FERRITIN: CPT

## 2023-07-20 PROCEDURE — 84466 ASSAY OF TRANSFERRIN: CPT

## 2023-07-20 PROCEDURE — 82950 GLUCOSE TEST: CPT

## 2023-07-20 PROCEDURE — 83540 ASSAY OF IRON: CPT

## 2023-07-20 PROCEDURE — 36415 COLL VENOUS BLD VENIPUNCTURE: CPT

## 2023-07-20 PROCEDURE — 85025 COMPLETE CBC W/AUTO DIFF WBC: CPT

## 2023-08-10 ENCOUNTER — OFFICE VISIT (OUTPATIENT)
Dept: HEMATOLOGY/ONCOLOGY | Facility: HOSPITAL | Age: 38
End: 2023-08-10
Attending: INTERNAL MEDICINE
Payer: COMMERCIAL

## 2023-08-10 VITALS
RESPIRATION RATE: 16 BRPM | DIASTOLIC BLOOD PRESSURE: 72 MMHG | TEMPERATURE: 98 F | HEIGHT: 63 IN | HEART RATE: 93 BPM | BODY MASS INDEX: 47.48 KG/M2 | OXYGEN SATURATION: 97 % | SYSTOLIC BLOOD PRESSURE: 122 MMHG | WEIGHT: 268 LBS

## 2023-08-10 DIAGNOSIS — Z79.01 CURRENT USE OF ANTICOAGULANT THERAPY: ICD-10-CM

## 2023-08-10 DIAGNOSIS — Z86.711 HISTORY OF PULMONARY EMBOLISM: ICD-10-CM

## 2023-08-10 DIAGNOSIS — D75.839 THROMBOCYTOSIS: Primary | ICD-10-CM

## 2023-08-10 PROCEDURE — 99214 OFFICE O/P EST MOD 30 MIN: CPT | Performed by: INTERNAL MEDICINE

## 2023-08-12 ENCOUNTER — ROUTINE PRENATAL (OUTPATIENT)
Dept: OBGYN CLINIC | Facility: CLINIC | Age: 38
End: 2023-08-12

## 2023-08-12 VITALS
BODY MASS INDEX: 48 KG/M2 | SYSTOLIC BLOOD PRESSURE: 115 MMHG | WEIGHT: 269 LBS | DIASTOLIC BLOOD PRESSURE: 75 MMHG | HEART RATE: 91 BPM

## 2023-08-12 DIAGNOSIS — Z34.92 ENCOUNTER FOR SUPERVISION OF NORMAL PREGNANCY IN SECOND TRIMESTER, UNSPECIFIED GRAVIDITY: Primary | ICD-10-CM

## 2023-08-12 LAB
BILIRUBIN: NEGATIVE
GLUCOSE (URINE DIPSTICK): NEGATIVE MG/DL
KETONES (URINE DIPSTICK): NEGATIVE MG/DL
LEUKOCYTES: NEGATIVE
MULTISTIX LOT#: 57 NUMERIC
NITRITE, URINE: NEGATIVE
OCCULT BLOOD: NEGATIVE
PH, URINE: 7.5 (ref 4.5–8)
SPECIFIC GRAVITY: 1.01 (ref 1–1.03)
UROBILINOGEN,SEMI-QN: 0.2 MG/DL (ref 0–1.9)

## 2023-08-12 PROCEDURE — 3078F DIAST BP <80 MM HG: CPT | Performed by: OBSTETRICS & GYNECOLOGY

## 2023-08-12 PROCEDURE — 90715 TDAP VACCINE 7 YRS/> IM: CPT | Performed by: OBSTETRICS & GYNECOLOGY

## 2023-08-12 PROCEDURE — 3074F SYST BP LT 130 MM HG: CPT | Performed by: OBSTETRICS & GYNECOLOGY

## 2023-08-12 PROCEDURE — 81002 URINALYSIS NONAUTO W/O SCOPE: CPT | Performed by: OBSTETRICS & GYNECOLOGY

## 2023-08-12 PROCEDURE — 90471 IMMUNIZATION ADMIN: CPT | Performed by: OBSTETRICS & GYNECOLOGY

## 2023-08-12 NOTE — PROGRESS NOTES
Feels well. Good FM. Has MFM . MFM recs she f/u with cardiology. Pt states she contacted cardiology through portal for clearnace letter for  and was told by RN that would need to come from OB. I explained she needs to make an appt to see Dr Ernie Marlow to discuss safety given her BAV as he is the heart specialist.  Tdap today. Saw Dr Lupe Everett last week. Lovenox and iron going well. MFM . RTC 2 wk.

## 2023-08-16 ENCOUNTER — HOSPITAL ENCOUNTER (OUTPATIENT)
Dept: PERINATAL CARE | Facility: HOSPITAL | Age: 38
Discharge: HOME | End: 2023-08-16
Attending: OBSTETRICS & GYNECOLOGY
Payer: COMMERCIAL

## 2023-08-16 VITALS
WEIGHT: 269 LBS | BODY MASS INDEX: 48 KG/M2 | SYSTOLIC BLOOD PRESSURE: 119 MMHG | DIASTOLIC BLOOD PRESSURE: 75 MMHG | HEART RATE: 106 BPM

## 2023-08-16 DIAGNOSIS — O99.210 OBESITY IN PREGNANCY: ICD-10-CM

## 2023-08-16 DIAGNOSIS — Q23.1 BICUSPID AORTIC VALVE: ICD-10-CM

## 2023-08-16 DIAGNOSIS — Z15.89 HOMOZYGOUS MTHFR MUTATION C677T: ICD-10-CM

## 2023-08-16 DIAGNOSIS — O09.513 PRIMIGRAVIDA OF ADVANCED MATERNAL AGE IN THIRD TRIMESTER: ICD-10-CM

## 2023-08-16 DIAGNOSIS — O09.513 PRIMIGRAVIDA OF ADVANCED MATERNAL AGE IN THIRD TRIMESTER: Primary | ICD-10-CM

## 2023-08-16 PROCEDURE — 76816 OB US FOLLOW-UP PER FETUS: CPT | Performed by: OBSTETRICS & GYNECOLOGY

## 2023-08-22 ENCOUNTER — ROUTINE PRENATAL (OUTPATIENT)
Dept: OBGYN CLINIC | Facility: CLINIC | Age: 38
End: 2023-08-22

## 2023-08-22 ENCOUNTER — LAB ENCOUNTER (OUTPATIENT)
Dept: LAB | Facility: HOSPITAL | Age: 38
End: 2023-08-22
Attending: INTERNAL MEDICINE
Payer: COMMERCIAL

## 2023-08-22 VITALS
WEIGHT: 272 LBS | HEART RATE: 87 BPM | BODY MASS INDEX: 48 KG/M2 | DIASTOLIC BLOOD PRESSURE: 75 MMHG | SYSTOLIC BLOOD PRESSURE: 111 MMHG

## 2023-08-22 DIAGNOSIS — D75.839 THROMBOCYTOSIS: ICD-10-CM

## 2023-08-22 DIAGNOSIS — Z34.80 ENCOUNTER FOR SUPERVISION OF OTHER NORMAL PREGNANCY, UNSPECIFIED TRIMESTER: Primary | ICD-10-CM

## 2023-08-22 DIAGNOSIS — Z79.01 CURRENT USE OF ANTICOAGULANT THERAPY: ICD-10-CM

## 2023-08-22 DIAGNOSIS — Z86.711 HISTORY OF PULMONARY EMBOLISM: ICD-10-CM

## 2023-08-22 LAB
APPEARANCE: CLEAR
BASOPHILS # BLD AUTO: 0.06 X10(3) UL (ref 0–0.2)
BASOPHILS NFR BLD AUTO: 0.7 %
BILIRUBIN: NEGATIVE
DEPRECATED HBV CORE AB SER IA-ACNC: 56.9 NG/ML
DEPRECATED RDW RBC AUTO: 46.8 FL (ref 35.1–46.3)
EOSINOPHIL # BLD AUTO: 0.09 X10(3) UL (ref 0–0.7)
EOSINOPHIL NFR BLD AUTO: 1.1 %
ERYTHROCYTE [DISTWIDTH] IN BLOOD BY AUTOMATED COUNT: 14 % (ref 11–15)
GLUCOSE (URINE DIPSTICK): NEGATIVE MG/DL
HCT VFR BLD AUTO: 31.8 %
HGB BLD-MCNC: 10.1 G/DL
IMM GRANULOCYTES # BLD AUTO: 0.06 X10(3) UL (ref 0–1)
IMM GRANULOCYTES NFR BLD: 0.7 %
IRON SATN MFR SERPL: 9 %
IRON SERPL-MCNC: 62 UG/DL
KETONES (URINE DIPSTICK): NEGATIVE MG/DL
LYMPHOCYTES # BLD AUTO: 2.12 X10(3) UL (ref 1–4)
LYMPHOCYTES NFR BLD AUTO: 25.2 %
MCH RBC QN AUTO: 28.9 PG (ref 26–34)
MCHC RBC AUTO-ENTMCNC: 31.8 G/DL (ref 31–37)
MCV RBC AUTO: 91.1 FL
MONOCYTES # BLD AUTO: 0.64 X10(3) UL (ref 0.1–1)
MONOCYTES NFR BLD AUTO: 7.6 %
MULTISTIX LOT#: ABNORMAL NUMERIC
NEUTROPHILS # BLD AUTO: 5.44 X10 (3) UL (ref 1.5–7.7)
NEUTROPHILS # BLD AUTO: 5.44 X10(3) UL (ref 1.5–7.7)
NEUTROPHILS NFR BLD AUTO: 64.7 %
NITRITE, URINE: NEGATIVE
OCCULT BLOOD: NEGATIVE
PH, URINE: 6 (ref 4.5–8)
PLATELET # BLD AUTO: 413 10(3)UL (ref 150–450)
RBC # BLD AUTO: 3.49 X10(6)UL
SPECIFIC GRAVITY: 1 (ref 1–1.03)
TIBC SERPL-MCNC: 727 UG/DL (ref 240–450)
TRANSFERRIN SERPL-MCNC: 488 MG/DL (ref 200–360)
URINE-COLOR: YELLOW
UROBILINOGEN,SEMI-QN: 0.2 MG/DL (ref 0–1.9)
WBC # BLD AUTO: 8.4 X10(3) UL (ref 4–11)

## 2023-08-22 PROCEDURE — 84466 ASSAY OF TRANSFERRIN: CPT

## 2023-08-22 PROCEDURE — 83540 ASSAY OF IRON: CPT

## 2023-08-22 PROCEDURE — 85025 COMPLETE CBC W/AUTO DIFF WBC: CPT

## 2023-08-22 PROCEDURE — 3074F SYST BP LT 130 MM HG: CPT | Performed by: OBSTETRICS & GYNECOLOGY

## 2023-08-22 PROCEDURE — 36415 COLL VENOUS BLD VENIPUNCTURE: CPT

## 2023-08-22 PROCEDURE — 81002 URINALYSIS NONAUTO W/O SCOPE: CPT | Performed by: OBSTETRICS & GYNECOLOGY

## 2023-08-22 PROCEDURE — 3078F DIAST BP <80 MM HG: CPT | Performed by: OBSTETRICS & GYNECOLOGY

## 2023-08-22 PROCEDURE — 82728 ASSAY OF FERRITIN: CPT

## 2023-08-22 NOTE — PROGRESS NOTES
Feeling well. Good FM. MFM growth reviewed. Saw Dr Cheyanne Black from cardiology. States he won't give recs on route on delivery but unable to lift heavy and concerned about bearing down  She will speak with MFM and I will discuss with MD group regrading route of delivery. Repeat CBC/iron studies today. NSTs scheduled  Lovenox going well. RTC 2 wks.

## 2023-08-28 ENCOUNTER — HOSPITAL ENCOUNTER (OUTPATIENT)
Facility: HOSPITAL | Age: 38
Discharge: HOME | End: 2023-08-28
Attending: OBSTETRICS & GYNECOLOGY | Admitting: OBSTETRICS & GYNECOLOGY
Payer: COMMERCIAL

## 2023-08-28 ENCOUNTER — TELEPHONE (OUTPATIENT)
Dept: OBGYN CLINIC | Facility: CLINIC | Age: 38
End: 2023-08-28

## 2023-08-28 VITALS
HEART RATE: 89 BPM | DIASTOLIC BLOOD PRESSURE: 68 MMHG | RESPIRATION RATE: 16 BRPM | SYSTOLIC BLOOD PRESSURE: 115 MMHG | TEMPERATURE: 98 F

## 2023-08-28 LAB
BILIRUB UR QL: NEGATIVE
CLARITY UR: CLEAR
FIBRONECTIN FETAL SPEC QL: NEGATIVE
GLUCOSE UR-MCNC: NORMAL MG/DL
HGB UR QL STRIP.AUTO: NEGATIVE
KETONES UR-MCNC: NEGATIVE MG/DL
LEUKOCYTE ESTERASE UR QL STRIP.AUTO: NEGATIVE
NITRITE UR QL STRIP.AUTO: NEGATIVE
PH UR: 7 [PH] (ref 5–8)
PROT UR-MCNC: NEGATIVE MG/DL
SP GR UR STRIP: 1.01 (ref 1–1.03)
UROBILINOGEN UR STRIP-ACNC: NORMAL

## 2023-08-28 PROCEDURE — 59025 FETAL NON-STRESS TEST: CPT | Performed by: OBSTETRICS & GYNECOLOGY

## 2023-08-28 RX ORDER — SODIUM CHLORIDE, SODIUM LACTATE, POTASSIUM CHLORIDE, CALCIUM CHLORIDE 600; 310; 30; 20 MG/100ML; MG/100ML; MG/100ML; MG/100ML
INJECTION, SOLUTION INTRAVENOUS CONTINUOUS
Status: DISCONTINUED | OUTPATIENT
Start: 2023-08-28 | End: 2023-08-28

## 2023-08-28 RX ORDER — BETAMETHASONE SODIUM PHOSPHATE AND BETAMETHASONE ACETATE 3; 3 MG/ML; MG/ML
12 INJECTION, SUSPENSION INTRA-ARTICULAR; INTRALESIONAL; INTRAMUSCULAR; SOFT TISSUE EVERY 24 HOURS
Status: DISCONTINUED | OUTPATIENT
Start: 2023-08-28 | End: 2023-08-28

## 2023-08-28 NOTE — TELEPHONE ENCOUNTER
Pt is 31w2d calling to report that she was seen in [facility] today to r/o  labor. Pt stated she received the first dose of Betamethasone today to help with fetal lung maturity and was advised to return tomorrow at 6:30am for her second dose of betamethasone. Pt stated that when she got home, her and her  were looking up more information about this medication and they have concerns. Pt stated that she was not counseled on the side effects of this medication and wants to know if this second dose is necessary because she has her doubts. Pt stated that she read a lot about how this can cause ADHD and developmental \"issues\" for baby later in life. Pt asking to speak with JOVANNY directly to discuss her concerns before she needs to receive second dose tomorrow AM.     Message to [facility].

## 2023-08-28 NOTE — TELEPHONE ENCOUNTER
Pt went to ER today for labor transferred to labor & delivery. They gave her a steriod.  Pt has questions about medication

## 2023-08-28 NOTE — TELEPHONE ENCOUNTER
Called patient and discussed the benefits involved in administration of Betamethasone in terms of lung maturity and that studies have not found long term complications from the use of betamethasone course. All questions answered and she plans to return tomorrow morning for her second dose.

## 2023-08-29 ENCOUNTER — HOSPITAL ENCOUNTER (OUTPATIENT)
Facility: HOSPITAL | Age: 38
Discharge: HOME | End: 2023-08-29
Attending: OBSTETRICS & GYNECOLOGY | Admitting: OBSTETRICS & GYNECOLOGY
Payer: COMMERCIAL

## 2023-08-29 VITALS — RESPIRATION RATE: 18 BRPM | SYSTOLIC BLOOD PRESSURE: 108 MMHG | HEART RATE: 79 BPM | DIASTOLIC BLOOD PRESSURE: 56 MMHG

## 2023-08-29 DIAGNOSIS — Z34.90 PREGNANT: ICD-10-CM

## 2023-08-29 PROCEDURE — 59025 FETAL NON-STRESS TEST: CPT

## 2023-08-29 PROCEDURE — 96372 THER/PROPH/DIAG INJ SC/IM: CPT

## 2023-08-29 RX ORDER — BETAMETHASONE SODIUM PHOSPHATE AND BETAMETHASONE ACETATE 3; 3 MG/ML; MG/ML
12 INJECTION, SUSPENSION INTRA-ARTICULAR; INTRALESIONAL; INTRAMUSCULAR; SOFT TISSUE ONCE
Status: COMPLETED | OUTPATIENT
Start: 2023-08-29 | End: 2023-08-29

## 2023-09-04 ENCOUNTER — NST DOCUMENTATION (OUTPATIENT)
Dept: OBGYN CLINIC | Facility: CLINIC | Age: 38
End: 2023-09-04

## 2023-09-06 ENCOUNTER — TELEPHONE (OUTPATIENT)
Dept: PERINATAL CARE | Facility: HOSPITAL | Age: 38
End: 2023-09-06

## 2023-09-06 ENCOUNTER — ROUTINE PRENATAL (OUTPATIENT)
Dept: OBGYN CLINIC | Facility: CLINIC | Age: 38
End: 2023-09-06

## 2023-09-06 VITALS
HEART RATE: 94 BPM | WEIGHT: 269.19 LBS | SYSTOLIC BLOOD PRESSURE: 111 MMHG | DIASTOLIC BLOOD PRESSURE: 74 MMHG | BODY MASS INDEX: 48 KG/M2

## 2023-09-06 DIAGNOSIS — Z34.93 ENCOUNTER FOR SUPERVISION OF NORMAL PREGNANCY IN THIRD TRIMESTER, UNSPECIFIED GRAVIDITY: Primary | ICD-10-CM

## 2023-09-06 PROBLEM — O60.03 PRETERM LABOR IN THIRD TRIMESTER WITHOUT DELIVERY: Status: ACTIVE | Noted: 2023-09-06

## 2023-09-06 PROBLEM — O60.03 PRETERM LABOR IN THIRD TRIMESTER WITHOUT DELIVERY (HCC): Status: ACTIVE | Noted: 2023-09-06

## 2023-09-06 LAB
APPEARANCE: CLEAR
GLUCOSE (URINE DIPSTICK): NEGATIVE MG/DL
KETONES (URINE DIPSTICK): NEGATIVE MG/DL
MULTISTIX LOT#: ABNORMAL NUMERIC
NITRITE, URINE: NEGATIVE
OCCULT BLOOD: NEGATIVE
PH, URINE: 6.5 (ref 4.5–8)
PROTEIN (URINE DIPSTICK): 30 MG/DL
SPECIFIC GRAVITY: 1.01 (ref 1–1.03)
URINE-COLOR: YELLOW
UROBILINOGEN,SEMI-QN: 1 MG/DL (ref 0–1.9)

## 2023-09-06 PROCEDURE — 3074F SYST BP LT 130 MM HG: CPT | Performed by: OBSTETRICS & GYNECOLOGY

## 2023-09-06 PROCEDURE — 3078F DIAST BP <80 MM HG: CPT | Performed by: OBSTETRICS & GYNECOLOGY

## 2023-09-06 PROCEDURE — 81002 URINALYSIS NONAUTO W/O SCOPE: CPT | Performed by: OBSTETRICS & GYNECOLOGY

## 2023-09-06 NOTE — TELEPHONE ENCOUNTER
Patient had questions about vaginal delivery vs . Her echocardiogram and cardiology note were reviewed. I feel vaginal delivery is an option for her. Virtually all gravidas with cardiac disease can expect to attempt vaginal delivery because it poses less cardiac risk than  delivery. Pregnant women with acquired cardiac disease who are considered functionally normal should be allowed to go into labor spontaneously. However, if there are any concerns about the functional adequacy of the heart and circulation, labor should be induced under controlled conditions. The timing of induction is individualized, taking into account the 's cardiac status, inducibility of the cervix, and fetal lung maturity. A long induction in a woman with an unfavorable cervix should be avoided. From a practical point of view, it may be useful to plan the induction so that delivery occurs during the working day when all hospital services are readily available. Intramuscular or intravenous opiates may be used to relieve pain and apprehension, but are not highly effective. Lumbar epidural anesthesia is highly effective for controlling labor pain and is recommended because it lowers pain-induced elevations of sympathetic activity, reduces the urge to push, and provides excellent anesthesia for operative procedures. The epidural should be dosed slowly with local anesthetics to maintain stable hemodynamics and adequate uteroplacental blood flow since venous return can be reduced by the anesthetic. Intravenous fluid administration must be monitored carefully to avoid over or under hydration. Once in labor, the woman should be placed in a lateral decubitus position to attenuate the hemodynamic fluctuations associated with major uterine contractions and the supine position.   Allow the forces of labor to descend the fetal head to the perineum, unassisted by maternal pushing, to avoid the undesirable circulatory effects of the Valsalva maneuver. Delivery may then be assisted by low forceps or vacuum extraction.

## 2023-09-07 NOTE — PROGRESS NOTES
Seeing Dr. Kirsty haile. Seeing cardiologist every 4 wks. Pt frustrated since cardiology punting method of delivery to [facility]. [facility] finally has her echo results so will d/w them at next visit for planned method of delivery. Got steriods 8/28 & 8/29.  No contractions

## 2023-09-13 ENCOUNTER — HOSPITAL ENCOUNTER (OUTPATIENT)
Dept: PERINATAL CARE | Facility: HOSPITAL | Age: 38
Discharge: HOME | End: 2023-09-13
Attending: OBSTETRICS & GYNECOLOGY
Payer: COMMERCIAL

## 2023-09-13 VITALS
DIASTOLIC BLOOD PRESSURE: 73 MMHG | WEIGHT: 269 LBS | SYSTOLIC BLOOD PRESSURE: 118 MMHG | BODY MASS INDEX: 48 KG/M2 | HEART RATE: 102 BPM

## 2023-09-13 DIAGNOSIS — O99.210 OBESITY IN PREGNANCY: ICD-10-CM

## 2023-09-13 DIAGNOSIS — O09.513 PRIMIGRAVIDA OF ADVANCED MATERNAL AGE IN THIRD TRIMESTER: ICD-10-CM

## 2023-09-13 DIAGNOSIS — Z15.89 HOMOZYGOUS MTHFR MUTATION C677T: ICD-10-CM

## 2023-09-13 DIAGNOSIS — Q23.1 BICUSPID AORTIC VALVE: ICD-10-CM

## 2023-09-13 DIAGNOSIS — D21.9 FIBROID: ICD-10-CM

## 2023-09-13 DIAGNOSIS — O09.523 AMA (ADVANCED MATERNAL AGE) MULTIGRAVIDA 35+, THIRD TRIMESTER: ICD-10-CM

## 2023-09-13 DIAGNOSIS — O99.213 MATERNAL MORBID OBESITY IN THIRD TRIMESTER, ANTEPARTUM (HCC): ICD-10-CM

## 2023-09-13 DIAGNOSIS — D21.9 FIBROID: Primary | ICD-10-CM

## 2023-09-13 DIAGNOSIS — Z86.711 HISTORY OF PULMONARY EMBOLISM: ICD-10-CM

## 2023-09-13 DIAGNOSIS — E66.01 MATERNAL MORBID OBESITY IN THIRD TRIMESTER, ANTEPARTUM (HCC): ICD-10-CM

## 2023-09-13 PROCEDURE — 76819 FETAL BIOPHYS PROFIL W/O NST: CPT

## 2023-09-13 PROCEDURE — 76816 OB US FOLLOW-UP PER FETUS: CPT | Performed by: OBSTETRICS & GYNECOLOGY

## 2023-09-18 ENCOUNTER — ROUTINE PRENATAL (OUTPATIENT)
Dept: OBGYN CLINIC | Facility: CLINIC | Age: 38
End: 2023-09-18

## 2023-09-18 ENCOUNTER — NST DOCUMENTATION (OUTPATIENT)
Dept: OBGYN CLINIC | Facility: CLINIC | Age: 38
End: 2023-09-18

## 2023-09-18 ENCOUNTER — HOSPITAL ENCOUNTER (OUTPATIENT)
Facility: HOSPITAL | Age: 38
Discharge: HOME | End: 2023-09-18
Attending: OBSTETRICS & GYNECOLOGY | Admitting: OBSTETRICS & GYNECOLOGY
Payer: COMMERCIAL

## 2023-09-18 ENCOUNTER — APPOINTMENT (OUTPATIENT)
Dept: OBGYN CLINIC | Facility: HOSPITAL | Age: 38
End: 2023-09-18
Attending: OBSTETRICS & GYNECOLOGY
Payer: COMMERCIAL

## 2023-09-18 VITALS
SYSTOLIC BLOOD PRESSURE: 116 MMHG | DIASTOLIC BLOOD PRESSURE: 77 MMHG | BODY MASS INDEX: 48 KG/M2 | WEIGHT: 270.38 LBS | HEART RATE: 77 BPM

## 2023-09-18 DIAGNOSIS — Z34.91 ENCOUNTER FOR SUPERVISION OF NORMAL PREGNANCY IN FIRST TRIMESTER, UNSPECIFIED GRAVIDITY: Primary | ICD-10-CM

## 2023-09-18 DIAGNOSIS — Z34.90 PREGNANCY: ICD-10-CM

## 2023-09-18 LAB
APPEARANCE: CLEAR
BILIRUBIN: NEGATIVE
GLUCOSE (URINE DIPSTICK): NEGATIVE MG/DL
KETONES (URINE DIPSTICK): NEGATIVE MG/DL
LEUKOCYTES: NEGATIVE
MULTISTIX LOT#: NORMAL NUMERIC
NITRITE, URINE: NEGATIVE
OCCULT BLOOD: NEGATIVE
PH, URINE: 7 (ref 4.5–8)
PROTEIN (URINE DIPSTICK): NEGATIVE MG/DL
SPECIFIC GRAVITY: 1.01 (ref 1–1.03)
URINE-COLOR: YELLOW
UROBILINOGEN,SEMI-QN: 0.2 MG/DL (ref 0–1.9)

## 2023-09-18 PROCEDURE — 3074F SYST BP LT 130 MM HG: CPT | Performed by: OBSTETRICS & GYNECOLOGY

## 2023-09-18 PROCEDURE — 59025 FETAL NON-STRESS TEST: CPT

## 2023-09-18 PROCEDURE — 3078F DIAST BP <80 MM HG: CPT | Performed by: OBSTETRICS & GYNECOLOGY

## 2023-09-18 PROCEDURE — 81002 URINALYSIS NONAUTO W/O SCOPE: CPT | Performed by: OBSTETRICS & GYNECOLOGY

## 2023-09-19 DIAGNOSIS — Z79.01 CURRENT USE OF ANTICOAGULANT THERAPY: ICD-10-CM

## 2023-09-19 DIAGNOSIS — Z86.711 HISTORY OF PULMONARY EMBOLISM: ICD-10-CM

## 2023-09-20 ENCOUNTER — PATIENT MESSAGE (OUTPATIENT)
Dept: OBGYN CLINIC | Facility: CLINIC | Age: 38
End: 2023-09-20

## 2023-09-20 ENCOUNTER — OFFICE VISIT (OUTPATIENT)
Dept: HEMATOLOGY/ONCOLOGY | Facility: HOSPITAL | Age: 38
End: 2023-09-20
Attending: INTERNAL MEDICINE
Payer: COMMERCIAL

## 2023-09-20 VITALS
BODY MASS INDEX: 47.8 KG/M2 | HEART RATE: 105 BPM | RESPIRATION RATE: 16 BRPM | TEMPERATURE: 99 F | HEIGHT: 63 IN | WEIGHT: 269.81 LBS | SYSTOLIC BLOOD PRESSURE: 127 MMHG | OXYGEN SATURATION: 97 % | DIASTOLIC BLOOD PRESSURE: 75 MMHG

## 2023-09-20 DIAGNOSIS — Z86.711 HISTORY OF PULMONARY EMBOLISM: ICD-10-CM

## 2023-09-20 DIAGNOSIS — Z79.01 CURRENT USE OF ANTICOAGULANT THERAPY: ICD-10-CM

## 2023-09-20 DIAGNOSIS — Z11.52 ENCOUNTER FOR SCREENING FOR COVID-19: Primary | ICD-10-CM

## 2023-09-20 PROCEDURE — 99214 OFFICE O/P EST MOD 30 MIN: CPT | Performed by: INTERNAL MEDICINE

## 2023-09-20 RX ORDER — ENOXAPARIN SODIUM 100 MG/ML
40 INJECTION SUBCUTANEOUS DAILY
Qty: 12 ML | Refills: 0 | OUTPATIENT
Start: 2023-09-20

## 2023-09-20 RX ORDER — ENOXAPARIN SODIUM 100 MG/ML
40 INJECTION SUBCUTANEOUS DAILY
Qty: 30 EACH | Refills: 0 | Status: SHIPPED | OUTPATIENT
Start: 2023-09-20

## 2023-09-21 ENCOUNTER — LAB ENCOUNTER (OUTPATIENT)
Dept: LAB | Age: 38
End: 2023-09-21
Attending: OBSTETRICS & GYNECOLOGY
Payer: COMMERCIAL

## 2023-09-21 DIAGNOSIS — Z11.52 ENCOUNTER FOR SCREENING FOR COVID-19: ICD-10-CM

## 2023-09-21 PROCEDURE — 87635 SARS-COV-2 COVID-19 AMP PRB: CPT

## 2023-09-22 ENCOUNTER — TELEPHONE (OUTPATIENT)
Dept: OBGYN CLINIC | Facility: CLINIC | Age: 38
End: 2023-09-22

## 2023-09-22 PROBLEM — O98.513 COVID-19 AFFECTING PREGNANCY IN THIRD TRIMESTER: Status: ACTIVE | Noted: 2023-09-22

## 2023-09-22 PROBLEM — U07.1 COVID-19 AFFECTING PREGNANCY IN THIRD TRIMESTER (HCC): Status: ACTIVE | Noted: 2023-09-22

## 2023-09-22 PROBLEM — O98.513 COVID-19 AFFECTING PREGNANCY IN THIRD TRIMESTER (HCC): Status: ACTIVE | Noted: 2023-09-22

## 2023-09-22 PROBLEM — U07.1 COVID-19 AFFECTING PREGNANCY IN THIRD TRIMESTER: Status: ACTIVE | Noted: 2023-09-22

## 2023-09-22 LAB — SARS-COV-2 RNA RESP QL NAA+PROBE: DETECTED

## 2023-09-22 NOTE — TELEPHONE ENCOUNTER
MELVINN notified of below and states no new orders since pt is already on Lovenox and doing weekly NST's. Pt informed and instructed on 5 day quarantine from first day of symptoms. Pt agrees. Covid during pregnancy dx updated in episode.

## 2023-09-22 NOTE — TELEPHONE ENCOUNTER
[facility] call to please advise if any change to PN care due to pt is 34w6d, positive Covid. BMI is 47.79. Pt is currently on Lovenox 40 mg daily due to hx of PE. Pt had consult with hematology on 9/20/23. Pt is already doing weekly NST's.

## 2023-09-25 ENCOUNTER — APPOINTMENT (OUTPATIENT)
Dept: OBGYN CLINIC | Facility: HOSPITAL | Age: 38
End: 2023-09-25
Attending: OBSTETRICS & GYNECOLOGY
Payer: COMMERCIAL

## 2023-09-25 ENCOUNTER — NST DOCUMENTATION (OUTPATIENT)
Dept: OBGYN CLINIC | Facility: CLINIC | Age: 38
End: 2023-09-25

## 2023-09-25 ENCOUNTER — HOSPITAL ENCOUNTER (OUTPATIENT)
Facility: HOSPITAL | Age: 38
Discharge: HOME | End: 2023-09-25
Attending: OBSTETRICS & GYNECOLOGY | Admitting: OBSTETRICS & GYNECOLOGY
Payer: COMMERCIAL

## 2023-09-25 VITALS
SYSTOLIC BLOOD PRESSURE: 128 MMHG | DIASTOLIC BLOOD PRESSURE: 73 MMHG | BODY MASS INDEX: 46.42 KG/M2 | WEIGHT: 262 LBS | HEIGHT: 63 IN | HEART RATE: 80 BPM

## 2023-09-25 DIAGNOSIS — Z34.90 PREGNANCY: ICD-10-CM

## 2023-09-25 PROCEDURE — 59025 FETAL NON-STRESS TEST: CPT

## 2023-09-25 NOTE — NST
Nonstress Test   Patient: Nani Chan    Gestation: 35w2d    Diagnosis from order: AMA       NST:        2023   NST DOCUMENTATION   Variability 6-25 BPM   Accelerations Yes   Decelerations None   Baseline 130 BPM   Uterine Irritability No   Contractions Not present   Acoustic Stimulator No   Nonstress Test Interpretation Reactive   Nonstress Test Second Interpretation Reactive   Comments  at 35w2d [facility] NST reactive NST home via Dr Shefali Morrison   NST Completed by Shirlie Saint RN   Disposition Home    Testing Plan Weekly NST   Provider Notified Dr Shefali Morrison         I agree with the above evaluation. NST completed.   Ludwin Anand MD  2023  6:00 PM

## 2023-09-25 NOTE — PROGRESS NOTES
Pt is a 41year old female admitted to TR4/TR4-A. Patient presents with:  Non-stress Test: WEEKLY NST/AMA/HIGH BMI/HX PULMONARY EMBOLISM/BICUSPID AORTIC VALVE W/REGURGITATION. Pt denies bleeding and LOF. +FM     Pt is  35w2d intra-uterine pregnancy. History obtained, consents signed. Oriented to room, staff, and plan of care.

## 2023-10-02 ENCOUNTER — NST DOCUMENTATION (OUTPATIENT)
Dept: OBGYN CLINIC | Facility: CLINIC | Age: 38
End: 2023-10-02

## 2023-10-02 ENCOUNTER — HOSPITAL ENCOUNTER (OUTPATIENT)
Dept: PERINATAL CARE | Facility: HOSPITAL | Age: 38
End: 2023-10-02
Attending: OBSTETRICS & GYNECOLOGY
Payer: COMMERCIAL

## 2023-10-02 DIAGNOSIS — O09.519 AMA (ADVANCED MATERNAL AGE) PRIMIGRAVIDA 35+: Primary | ICD-10-CM

## 2023-10-02 PROCEDURE — 59025 FETAL NON-STRESS TEST: CPT

## 2023-10-02 NOTE — NST
Nonstress Test   Patient: Venita Singh    Gestation: 36w2d    Diagnosis from order:  AMA       NST:        10/2/2023   NST DOCUMENTATION   Variability 6-25 BPM   Accelerations Yes   Decelerations None   Baseline 125 BPM   Uterine Irritability No   Contractions Not present   Acoustic Stimulator No   Nonstress Test Interpretation Reactive   NST Completed by Severo Lo RN   Disposition Home    Testing Plan Weekly NST   Provider Notified Vivek Brush MD         I agree with the above evaluation. NST completed.   Tylor No MD  10/2/2023  4:38 PM

## 2023-10-03 ENCOUNTER — ROUTINE PRENATAL (OUTPATIENT)
Dept: OBGYN CLINIC | Facility: CLINIC | Age: 38
End: 2023-10-03

## 2023-10-03 ENCOUNTER — LAB ENCOUNTER (OUTPATIENT)
Dept: LAB | Facility: HOSPITAL | Age: 38
End: 2023-10-03
Attending: OBSTETRICS & GYNECOLOGY
Payer: COMMERCIAL

## 2023-10-03 VITALS
BODY MASS INDEX: 48 KG/M2 | SYSTOLIC BLOOD PRESSURE: 121 MMHG | DIASTOLIC BLOOD PRESSURE: 82 MMHG | HEART RATE: 98 BPM | WEIGHT: 271.63 LBS

## 2023-10-03 DIAGNOSIS — Z34.91 ENCOUNTER FOR SUPERVISION OF NORMAL PREGNANCY IN FIRST TRIMESTER, UNSPECIFIED GRAVIDITY: Primary | ICD-10-CM

## 2023-10-03 DIAGNOSIS — L29.9 PRURITUS: ICD-10-CM

## 2023-10-03 DIAGNOSIS — Z34.91 ENCOUNTER FOR SUPERVISION OF NORMAL PREGNANCY IN FIRST TRIMESTER, UNSPECIFIED GRAVIDITY: ICD-10-CM

## 2023-10-03 LAB
APPEARANCE: CLEAR
BILIRUBIN: NEGATIVE
DEPRECATED RDW RBC AUTO: 48.1 FL (ref 35.1–46.3)
ERYTHROCYTE [DISTWIDTH] IN BLOOD BY AUTOMATED COUNT: 14.6 % (ref 11–15)
GLUCOSE (URINE DIPSTICK): NEGATIVE MG/DL
HCT VFR BLD AUTO: 33.6 %
HGB BLD-MCNC: 10.6 G/DL
KETONES (URINE DIPSTICK): NEGATIVE MG/DL
MCH RBC QN AUTO: 28.8 PG (ref 26–34)
MCHC RBC AUTO-ENTMCNC: 31.5 G/DL (ref 31–37)
MCV RBC AUTO: 91.3 FL
MULTISTIX LOT#: ABNORMAL NUMERIC
NITRITE, URINE: NEGATIVE
OCCULT BLOOD: NEGATIVE
PH, URINE: 7.5 (ref 4.5–8)
PLATELET # BLD AUTO: 446 10(3)UL (ref 150–450)
RBC # BLD AUTO: 3.68 X10(6)UL
SPECIFIC GRAVITY: 1.01 (ref 1–1.03)
URINE-COLOR: YELLOW
UROBILINOGEN,SEMI-QN: 0.2 MG/DL (ref 0–1.9)
WBC # BLD AUTO: 9.1 X10(3) UL (ref 4–11)

## 2023-10-03 PROCEDURE — 36415 COLL VENOUS BLD VENIPUNCTURE: CPT

## 2023-10-03 PROCEDURE — 87389 HIV-1 AG W/HIV-1&-2 AB AG IA: CPT

## 2023-10-03 PROCEDURE — 90686 IIV4 VACC NO PRSV 0.5 ML IM: CPT | Performed by: OBSTETRICS & GYNECOLOGY

## 2023-10-03 PROCEDURE — 3074F SYST BP LT 130 MM HG: CPT | Performed by: OBSTETRICS & GYNECOLOGY

## 2023-10-03 PROCEDURE — 90471 IMMUNIZATION ADMIN: CPT | Performed by: OBSTETRICS & GYNECOLOGY

## 2023-10-03 PROCEDURE — 3079F DIAST BP 80-89 MM HG: CPT | Performed by: OBSTETRICS & GYNECOLOGY

## 2023-10-03 PROCEDURE — 81002 URINALYSIS NONAUTO W/O SCOPE: CPT | Performed by: OBSTETRICS & GYNECOLOGY

## 2023-10-03 PROCEDURE — 86780 TREPONEMA PALLIDUM: CPT

## 2023-10-03 PROCEDURE — 85027 COMPLETE CBC AUTOMATED: CPT

## 2023-10-03 PROCEDURE — 82239 BILE ACIDS TOTAL: CPT

## 2023-10-03 NOTE — PROGRESS NOTES
GBS cxs done, c/o generalized itching- ordered bile acids, will d/w group about IOL, lovenox convert to heparin?

## 2023-10-04 LAB — T PALLIDUM AB SER QL: NEGATIVE

## 2023-10-05 LAB — BILE ACIDS: 16.2 UMOL/L

## 2023-10-06 ENCOUNTER — PATIENT MESSAGE (OUTPATIENT)
Dept: OBGYN CLINIC | Facility: CLINIC | Age: 38
End: 2023-10-06

## 2023-10-06 DIAGNOSIS — Z34.93 ENCOUNTER FOR SUPERVISION OF NORMAL PREGNANCY IN THIRD TRIMESTER, UNSPECIFIED GRAVIDITY: Primary | ICD-10-CM

## 2023-10-06 PROBLEM — B95.1 POSITIVE GBS TEST: Status: ACTIVE | Noted: 2023-10-06

## 2023-10-06 NOTE — TELEPHONE ENCOUNTER
From: Marion Sharp  To: Edwar Eaton. Page  Sent: 10/6/2023 9:38 AM CDT  Subject: Test Results    Dr. Marian Perry had me take a test because when I saw her on Tuesday I informed her that my body had been itching a lot. The test results came back and I have not heard from anyone. If someone can advise me of next steps that would be great. Thank you.    Marion Sharp

## 2023-10-06 NOTE — TELEPHONE ENCOUNTER
Bile acids were reviewed with CAP on call. Per CAP, order LFTs and have pt see MFM on Monday if possible. Pt inofmred of recs. Order for LFTs done and pt advised to fast for testing. Pt already has appt with MFM on Monday. Pt states she is still itching all over, but it is worse at night. Pt advised she can try Benadryl at night to sleep. Will await LFT results.

## 2023-10-07 ENCOUNTER — TELEPHONE (OUTPATIENT)
Dept: OBGYN CLINIC | Facility: CLINIC | Age: 38
End: 2023-10-07

## 2023-10-07 ENCOUNTER — LAB ENCOUNTER (OUTPATIENT)
Dept: LAB | Facility: HOSPITAL | Age: 38
End: 2023-10-07
Attending: OBSTETRICS & GYNECOLOGY
Payer: COMMERCIAL

## 2023-10-07 DIAGNOSIS — Z34.93 ENCOUNTER FOR SUPERVISION OF NORMAL PREGNANCY IN THIRD TRIMESTER, UNSPECIFIED GRAVIDITY: ICD-10-CM

## 2023-10-07 DIAGNOSIS — K83.1 CHOLESTASIS DURING PREGNANCY IN THIRD TRIMESTER: Primary | ICD-10-CM

## 2023-10-07 DIAGNOSIS — O26.613 CHOLESTASIS DURING PREGNANCY IN THIRD TRIMESTER: Primary | ICD-10-CM

## 2023-10-07 LAB
ALBUMIN SERPL-MCNC: 2.1 G/DL (ref 3.4–5)
ALP LIVER SERPL-CCNC: 273 U/L
ALT SERPL-CCNC: 170 U/L
AST SERPL-CCNC: 135 U/L (ref 15–37)
BILIRUB DIRECT SERPL-MCNC: 0.1 MG/DL (ref 0–0.2)
BILIRUB SERPL-MCNC: 0.3 MG/DL (ref 0.1–2)
PROT SERPL-MCNC: 7.7 G/DL (ref 6.4–8.2)

## 2023-10-07 PROCEDURE — 80076 HEPATIC FUNCTION PANEL: CPT

## 2023-10-07 PROCEDURE — 36415 COLL VENOUS BLD VENIPUNCTURE: CPT

## 2023-10-07 RX ORDER — HEPARIN SODIUM 10000 [USP'U]/ML
30 INJECTION, SOLUTION INTRAVENOUS; SUBCUTANEOUS ONCE
Qty: 0.37 ML | Refills: 0 | Status: SHIPPED | OUTPATIENT
Start: 2023-10-07 | End: 2023-10-07 | Stop reason: CLARIF

## 2023-10-07 RX ORDER — URSODIOL 300 MG/1
300 CAPSULE ORAL 3 TIMES DAILY
Qty: 30 CAPSULE | Refills: 0 | Status: ON HOLD | OUTPATIENT
Start: 2023-10-07

## 2023-10-09 ENCOUNTER — LAB ENCOUNTER (OUTPATIENT)
Dept: LAB | Facility: HOSPITAL | Age: 38
End: 2023-10-09
Attending: OBSTETRICS & GYNECOLOGY
Payer: COMMERCIAL

## 2023-10-09 ENCOUNTER — HOSPITAL ENCOUNTER (OUTPATIENT)
Dept: PERINATAL CARE | Facility: HOSPITAL | Age: 38
End: 2023-10-09
Attending: OBSTETRICS & GYNECOLOGY

## 2023-10-09 ENCOUNTER — HOSPITAL ENCOUNTER (OUTPATIENT)
Dept: PERINATAL CARE | Facility: HOSPITAL | Age: 38
Discharge: HOME | End: 2023-10-09
Attending: OBSTETRICS & GYNECOLOGY
Payer: COMMERCIAL

## 2023-10-09 ENCOUNTER — HOSPITAL ENCOUNTER (INPATIENT)
Facility: HOSPITAL | Age: 38
LOS: 3 days | Discharge: HOME | End: 2023-10-12
Attending: OBSTETRICS & GYNECOLOGY | Admitting: OBSTETRICS & GYNECOLOGY
Payer: COMMERCIAL

## 2023-10-09 VITALS
HEART RATE: 128 BPM | WEIGHT: 271 LBS | SYSTOLIC BLOOD PRESSURE: 136 MMHG | DIASTOLIC BLOOD PRESSURE: 80 MMHG | BODY MASS INDEX: 48 KG/M2

## 2023-10-09 DIAGNOSIS — O09.513 PRIMIGRAVIDA OF ADVANCED MATERNAL AGE IN THIRD TRIMESTER: Primary | ICD-10-CM

## 2023-10-09 DIAGNOSIS — E66.01 MATERNAL MORBID OBESITY IN THIRD TRIMESTER, ANTEPARTUM (HCC): ICD-10-CM

## 2023-10-09 DIAGNOSIS — O09.513 PRIMIGRAVIDA OF ADVANCED MATERNAL AGE IN THIRD TRIMESTER: ICD-10-CM

## 2023-10-09 DIAGNOSIS — O99.210 OBESITY IN PREGNANCY: ICD-10-CM

## 2023-10-09 DIAGNOSIS — O26.643 INTRAHEPATIC CHOLESTASIS OF PREGNANCY, THIRD TRIMESTER: ICD-10-CM

## 2023-10-09 DIAGNOSIS — O99.213 MATERNAL MORBID OBESITY IN THIRD TRIMESTER, ANTEPARTUM (HCC): ICD-10-CM

## 2023-10-09 DIAGNOSIS — K83.1 CHOLESTASIS DURING PREGNANCY IN THIRD TRIMESTER: ICD-10-CM

## 2023-10-09 DIAGNOSIS — Z86.711 HISTORY OF PULMONARY EMBOLISM: ICD-10-CM

## 2023-10-09 DIAGNOSIS — O26.613 CHOLESTASIS DURING PREGNANCY IN THIRD TRIMESTER: ICD-10-CM

## 2023-10-09 DIAGNOSIS — O41.03X0 OLIGOHYDRAMNIOS IN THIRD TRIMESTER, SINGLE OR UNSPECIFIED FETUS: ICD-10-CM

## 2023-10-09 LAB
ALBUMIN SERPL-MCNC: 2.2 G/DL (ref 3.4–5)
ALBUMIN/GLOB SERPL: 0.4 {RATIO} (ref 1–2)
ALP LIVER SERPL-CCNC: 259 U/L
ALT SERPL-CCNC: 155 U/L
ANION GAP SERPL CALC-SCNC: 10 MMOL/L (ref 0–18)
ANTIBODY SCREEN: NEGATIVE
AST SERPL-CCNC: 115 U/L (ref 15–37)
BASOPHILS # BLD AUTO: 0.05 X10(3) UL (ref 0–0.2)
BASOPHILS NFR BLD AUTO: 0.6 %
BILIRUB SERPL-MCNC: 0.3 MG/DL (ref 0.1–2)
BUN BLD-MCNC: 11 MG/DL (ref 7–18)
BUN/CREAT SERPL: 15.1 (ref 10–20)
CALCIUM BLD-MCNC: 9.9 MG/DL (ref 8.5–10.1)
CHLORIDE SERPL-SCNC: 106 MMOL/L (ref 98–112)
CO2 SERPL-SCNC: 21 MMOL/L (ref 21–32)
CREAT BLD-MCNC: 0.73 MG/DL
DEPRECATED RDW RBC AUTO: 49.6 FL (ref 35.1–46.3)
EGFRCR SERPLBLD CKD-EPI 2021: 109 ML/MIN/1.73M2 (ref 60–?)
EOSINOPHIL # BLD AUTO: 0.04 X10(3) UL (ref 0–0.7)
EOSINOPHIL NFR BLD AUTO: 0.5 %
ERYTHROCYTE [DISTWIDTH] IN BLOOD BY AUTOMATED COUNT: 14.9 % (ref 11–15)
FASTING STATUS PATIENT QL REPORTED: YES
GLOBULIN PLAS-MCNC: 5.3 G/DL (ref 2.8–4.4)
GLUCOSE BLD-MCNC: 109 MG/DL (ref 70–99)
HCT VFR BLD AUTO: 34.7 %
HGB BLD-MCNC: 11 G/DL
IMM GRANULOCYTES # BLD AUTO: 0.06 X10(3) UL (ref 0–1)
IMM GRANULOCYTES NFR BLD: 0.8 %
LYMPHOCYTES # BLD AUTO: 1.99 X10(3) UL (ref 1–4)
LYMPHOCYTES NFR BLD AUTO: 25.4 %
MCH RBC QN AUTO: 28.9 PG (ref 26–34)
MCHC RBC AUTO-ENTMCNC: 31.7 G/DL (ref 31–37)
MCV RBC AUTO: 91.3 FL
MONOCYTES # BLD AUTO: 0.48 X10(3) UL (ref 0.1–1)
MONOCYTES NFR BLD AUTO: 6.1 %
NEUTROPHILS # BLD AUTO: 5.21 X10 (3) UL (ref 1.5–7.7)
NEUTROPHILS # BLD AUTO: 5.21 X10(3) UL (ref 1.5–7.7)
NEUTROPHILS NFR BLD AUTO: 66.6 %
OSMOLALITY SERPL CALC.SUM OF ELEC: 284 MOSM/KG (ref 275–295)
PLATELET # BLD AUTO: 496 10(3)UL (ref 150–450)
PLATELET # BLD AUTO: 522 10(3)UL (ref 150–450)
POTASSIUM SERPL-SCNC: 4.1 MMOL/L (ref 3.5–5.1)
PROT SERPL-MCNC: 7.5 G/DL (ref 6.4–8.2)
RBC # BLD AUTO: 3.8 X10(6)UL
RH BLOOD TYPE: POSITIVE
SODIUM SERPL-SCNC: 137 MMOL/L (ref 136–145)
WBC # BLD AUTO: 7.8 X10(3) UL (ref 4–11)

## 2023-10-09 PROCEDURE — 36415 COLL VENOUS BLD VENIPUNCTURE: CPT

## 2023-10-09 PROCEDURE — 3E0P7VZ INTRODUCTION OF HORMONE INTO FEMALE REPRODUCTIVE, VIA NATURAL OR ARTIFICIAL OPENING: ICD-10-PCS | Performed by: OBSTETRICS & GYNECOLOGY

## 2023-10-09 PROCEDURE — 76816 OB US FOLLOW-UP PER FETUS: CPT | Performed by: OBSTETRICS & GYNECOLOGY

## 2023-10-09 PROCEDURE — 85049 AUTOMATED PLATELET COUNT: CPT

## 2023-10-09 PROCEDURE — 76819 FETAL BIOPHYS PROFIL W/O NST: CPT

## 2023-10-09 PROCEDURE — 80053 COMPREHEN METABOLIC PANEL: CPT

## 2023-10-09 RX ORDER — DEXTROSE, SODIUM CHLORIDE, SODIUM LACTATE, POTASSIUM CHLORIDE, AND CALCIUM CHLORIDE 5; .6; .31; .03; .02 G/100ML; G/100ML; G/100ML; G/100ML; G/100ML
INJECTION, SOLUTION INTRAVENOUS CONTINUOUS
Status: DISCONTINUED | OUTPATIENT
Start: 2023-10-09 | End: 2023-10-10 | Stop reason: HOSPADM

## 2023-10-09 RX ORDER — TERBUTALINE SULFATE 1 MG/ML
0.25 INJECTION, SOLUTION SUBCUTANEOUS AS NEEDED
Status: DISCONTINUED | OUTPATIENT
Start: 2023-10-09 | End: 2023-10-10 | Stop reason: HOSPADM

## 2023-10-09 RX ORDER — NALBUPHINE HYDROCHLORIDE 10 MG/ML
10 INJECTION, SOLUTION INTRAMUSCULAR; INTRAVENOUS; SUBCUTANEOUS EVERY 6 HOURS PRN
Status: DISCONTINUED | OUTPATIENT
Start: 2023-10-09 | End: 2023-10-10 | Stop reason: HOSPADM

## 2023-10-09 RX ORDER — ACETAMINOPHEN 500 MG
500 TABLET ORAL EVERY 6 HOURS PRN
Status: DISCONTINUED | OUTPATIENT
Start: 2023-10-09 | End: 2023-10-10

## 2023-10-09 RX ORDER — ACETAMINOPHEN 500 MG
1000 TABLET ORAL EVERY 6 HOURS PRN
Status: DISCONTINUED | OUTPATIENT
Start: 2023-10-09 | End: 2023-10-10

## 2023-10-09 RX ORDER — CITRIC ACID/SODIUM CITRATE 334-500MG
30 SOLUTION, ORAL ORAL AS NEEDED
Status: DISCONTINUED | OUTPATIENT
Start: 2023-10-09 | End: 2023-10-10 | Stop reason: HOSPADM

## 2023-10-09 RX ORDER — SODIUM CHLORIDE, SODIUM LACTATE, POTASSIUM CHLORIDE, CALCIUM CHLORIDE 600; 310; 30; 20 MG/100ML; MG/100ML; MG/100ML; MG/100ML
INJECTION, SOLUTION INTRAVENOUS AS NEEDED
Status: DISCONTINUED | OUTPATIENT
Start: 2023-10-09 | End: 2023-10-10 | Stop reason: HOSPADM

## 2023-10-09 RX ORDER — HYDROXYZINE HYDROCHLORIDE 50 MG/ML
50 INJECTION, SOLUTION INTRAMUSCULAR EVERY 6 HOURS PRN
Status: DISCONTINUED | OUTPATIENT
Start: 2023-10-09 | End: 2023-10-10 | Stop reason: HOSPADM

## 2023-10-09 RX ORDER — ONDANSETRON 2 MG/ML
4 INJECTION INTRAMUSCULAR; INTRAVENOUS EVERY 6 HOURS PRN
Status: DISCONTINUED | OUTPATIENT
Start: 2023-10-09 | End: 2023-10-10

## 2023-10-09 RX ORDER — IBUPROFEN 600 MG/1
600 TABLET ORAL ONCE AS NEEDED
Status: DISCONTINUED | OUTPATIENT
Start: 2023-10-09 | End: 2023-10-10 | Stop reason: HOSPADM

## 2023-10-09 RX ORDER — HEPARIN SODIUM 10000 [USP'U]/ML
30 INJECTION, SOLUTION INTRAVENOUS; SUBCUTANEOUS ONCE
COMMUNITY
End: 2023-10-12

## 2023-10-09 RX ORDER — LIDOCAINE HYDROCHLORIDE 10 MG/ML
30 INJECTION, SOLUTION EPIDURAL; INFILTRATION; INTRACAUDAL; PERINEURAL ONCE
Status: DISCONTINUED | OUTPATIENT
Start: 2023-10-09 | End: 2023-10-10 | Stop reason: HOSPADM

## 2023-10-09 NOTE — H&P
[address] Patient Status:  Inpatient    3/24/1985 MRN [id number]   Location [address] Attending Agustin Mederos, [address] [facility] Day # 0 PCP Leisa Daily. DO Jonny     Date of Admission:  10/9/2023      HPI:   Rodrigo Low is a 41year old  female, current EGA of 37w2d with an estimated date of delivery of: 10/28/2023, by Last Menstrual Period      Rodrigo Low is being admitted for induction of labor. Her current obstetrical history is significant for history of PE, homozygous MTHFR, bicuspid aortic valve, uterine fibroid - 6 cm, obesity in pregnancy, rubella non-immune, Covid 19 infection in third trimester ( 23 ), recently diagnosed with IHCP and positive GBS culture. She is Rh positive, serology negative and no HTN, GDM or anemia. She had low risk NIPT- male, normal level 2 and echo, growth US's, last today at 61%, BPP 6/8,  BERNARDO 7.6 cm and no pocket 2 cm wide. Personal communication with Dr Akira Lind and recs for IOL. Patient reports no complaints .      Fetal Movement: good    History   Obstetric History:   OB History    Para Term  AB Living   2 0 0 0 1 0   SAB IAB Ectopic Multiple Live Births   1 0 0 0 0      # Outcome Date GA Lbr Yoseph/2nd Weight Sex Delivery Anes PTL Lv   2 Current            1 SAB 22 6w0d            Past Medical History:   Past Medical History:   Diagnosis Date    Amaurosis fugax of right eye 2020    Ankle swelling     Pain  Neg Doppler    Lacunar infarction ([address] 2020    Moderate aortic regurgitation 2020    Pulmonary embolism ([address] 2013    no associated DVT's believed from OCP; tx with 6 months Coumadin    Transient homonymous hemianopia 2020     Past Surgerical History:   Past Surgical History:   Procedure Laterality Date    WISDOM TEETH REMOVED      no associated bleeding complications     Social History: Social History    Tobacco Use Smoking status: Never      Smokeless tobacco: Never    Alcohol use: Yes      Comment: Occasionally. Stopped when found out was pregnant       Allergies/Medications: Allergies:   No Known Allergies  Medications:  ursodiol 300 MG Oral Cap, Take 1 capsule (300 mg total) by mouth 3 (three) times daily. , Disp: 30 capsule, Rfl: 0  enoxaparin (LOVENOX) 40 MG/0.4ML Injection Solution Prefilled Syringe, Inject 0.4 mL (40 mg total) into the skin daily. , Disp: 30 each, Rfl: 0  ferrous sulfate 325 (65 FE) MG Oral Tab EC, Take 1 tablet (325 mg total) by mouth daily with breakfast., Disp: , Rfl:   prenatal vitamin with DHA 27-0.8-228 MG Oral Cap, Take 1 capsule by mouth daily. , Disp: , Rfl:   folic acid 237 MCG Oral Tab, Take 12.5 tablets (5 mg total) by mouth daily. , Disp: , Rfl:   VITAMIN D, CHOLECALCIFEROL, OR, Take 5 mg by mouth., Disp: , Rfl:   Alcohol Swabs Does not apply Pads, Use as needed prior to injections, Disp: 100 each, Rfl: 1  BD [employer] Does not apply Misc, For needle and syringe disposal, Disp: 1 each, Rfl: PRN        Review of Systems:   As documented in HPI      Physical Exam:   Pulse:  [128] 128  BP: (136)/(80) 136/80    Constitutional: alert and cooperative in No distress  Abdomen: gravid nontender  Vaginal exam:  Dilation: 0 cm    Effacement: 0 %    Station: ballotable    Position: Cephalic by US today        FHT assessment:   Baseline: 130 bpm   Variability: moderate   Accels:  present   Decels: No   Tocos:  No   Category: 1 tracing    Results:     Collected and pending  CMP earlier today with AST= 115 and ALT = 155       Assessment/Plan:     Oligohydramnios in third trimester        History of pulmonary embolism        Bicuspid aortic valve        Homozygous MTHFR mutation C677T        Rubella non-immune status, antepartum        COVID-19 affecting pregnancy in third trimester        Positive GBS test           Not in labor. Treatment Plan:  Intervention: Last Heparin at 0730 today.  OK for lunch meal and plan Cytotec to start around 1400. Thanh HURT  [id number] [facility],   10/9/2023  12:00 PM

## 2023-10-09 NOTE — PROGRESS NOTES
Pt is a 41year old female admitted to 375/375-A. Patient presents with:  Scheduled Induction: Oligohydramnios, Cholestasis     Pt is  37w2d intra-uterine pregnancy. History obtained, consents signed. Oriented to room, staff, and plan of care.

## 2023-10-10 ENCOUNTER — TELEPHONE (OUTPATIENT)
Dept: OBGYN CLINIC | Facility: CLINIC | Age: 38
End: 2023-10-10

## 2023-10-10 LAB
ALBUMIN SERPL-MCNC: 2.1 G/DL (ref 3.4–5)
ALBUMIN/GLOB SERPL: 0.4 {RATIO} (ref 1–2)
ALP LIVER SERPL-CCNC: 248 U/L
ALT SERPL-CCNC: 167 U/L
ANION GAP SERPL CALC-SCNC: 7 MMOL/L (ref 0–18)
AST SERPL-CCNC: 127 U/L (ref 15–37)
BILIRUB SERPL-MCNC: 0.3 MG/DL (ref 0.1–2)
BUN BLD-MCNC: 10 MG/DL (ref 7–18)
BUN/CREAT SERPL: 14.1 (ref 10–20)
CALCIUM BLD-MCNC: 9.4 MG/DL (ref 8.5–10.1)
CHLORIDE SERPL-SCNC: 106 MMOL/L (ref 98–112)
CO2 SERPL-SCNC: 25 MMOL/L (ref 21–32)
CREAT BLD-MCNC: 0.71 MG/DL
CREAT UR-SCNC: 33.6 MG/DL
EGFRCR SERPLBLD CKD-EPI 2021: 112 ML/MIN/1.73M2 (ref 60–?)
GLOBULIN PLAS-MCNC: 5.5 G/DL (ref 2.8–4.4)
GLUCOSE BLD-MCNC: 104 MG/DL (ref 70–99)
OSMOLALITY SERPL CALC.SUM OF ELEC: 285 MOSM/KG (ref 275–295)
POTASSIUM SERPL-SCNC: 4.1 MMOL/L (ref 3.5–5.1)
PROT SERPL-MCNC: 7.6 G/DL (ref 6.4–8.2)
PROT UR-MCNC: 6.2 MG/DL
PROT/CREAT UR-RTO: 0.18
SODIUM SERPL-SCNC: 138 MMOL/L (ref 136–145)

## 2023-10-10 PROCEDURE — 4A1H74Z MONITORING OF PRODUCTS OF CONCEPTION, CARDIAC ELECTRICAL ACTIVITY, VIA NATURAL OR ARTIFICIAL OPENING: ICD-10-PCS | Performed by: OBSTETRICS & GYNECOLOGY

## 2023-10-10 PROCEDURE — 59400 OBSTETRICAL CARE: CPT | Performed by: OBSTETRICS & GYNECOLOGY

## 2023-10-10 PROCEDURE — 3E033VJ INTRODUCTION OF OTHER HORMONE INTO PERIPHERAL VEIN, PERCUTANEOUS APPROACH: ICD-10-PCS | Performed by: OBSTETRICS & GYNECOLOGY

## 2023-10-10 PROCEDURE — 10H073Z INSERTION OF MONITORING ELECTRODE INTO PRODUCTS OF CONCEPTION, VIA NATURAL OR ARTIFICIAL OPENING: ICD-10-PCS | Performed by: OBSTETRICS & GYNECOLOGY

## 2023-10-10 PROCEDURE — 0KQM0ZZ REPAIR PERINEUM MUSCLE, OPEN APPROACH: ICD-10-PCS | Performed by: OBSTETRICS & GYNECOLOGY

## 2023-10-10 PROCEDURE — 10H07YZ INSERTION OF OTHER DEVICE INTO PRODUCTS OF CONCEPTION, VIA NATURAL OR ARTIFICIAL OPENING: ICD-10-PCS | Performed by: OBSTETRICS & GYNECOLOGY

## 2023-10-10 RX ORDER — LIDOCAINE HCL/EPINEPHRINE/PF 2%-1:200K
VIAL (ML) INJECTION
Status: DISPENSED
Start: 2023-10-10 | End: 2023-10-10

## 2023-10-10 RX ORDER — BISACODYL 10 MG
10 SUPPOSITORY, RECTAL RECTAL ONCE AS NEEDED
Status: DISCONTINUED | OUTPATIENT
Start: 2023-10-10 | End: 2023-10-12

## 2023-10-10 RX ORDER — MISOPROSTOL 200 UG/1
TABLET ORAL
Status: DISPENSED
Start: 2023-10-10 | End: 2023-10-11

## 2023-10-10 RX ORDER — NALBUPHINE HYDROCHLORIDE 10 MG/ML
2.5 INJECTION, SOLUTION INTRAMUSCULAR; INTRAVENOUS; SUBCUTANEOUS
Status: DISCONTINUED | OUTPATIENT
Start: 2023-10-10 | End: 2023-10-10

## 2023-10-10 RX ORDER — BUPIVACAINE HCL/0.9 % NACL/PF 0.25 %
5 PLASTIC BAG, INJECTION (ML) EPIDURAL AS NEEDED
Status: DISCONTINUED | OUTPATIENT
Start: 2023-10-10 | End: 2023-10-10

## 2023-10-10 RX ORDER — ENOXAPARIN SODIUM 150 MG/ML
1 INJECTION SUBCUTANEOUS DAILY
Status: DISCONTINUED | OUTPATIENT
Start: 2023-10-11 | End: 2023-10-12

## 2023-10-10 RX ORDER — AMMONIA INHALANTS 0.04 G/.3ML
0.3 INHALANT RESPIRATORY (INHALATION) AS NEEDED
Status: DISCONTINUED | OUTPATIENT
Start: 2023-10-10 | End: 2023-10-12

## 2023-10-10 RX ORDER — ACETAMINOPHEN 500 MG
1000 TABLET ORAL EVERY 6 HOURS PRN
Status: DISCONTINUED | OUTPATIENT
Start: 2023-10-10 | End: 2023-10-12

## 2023-10-10 RX ORDER — TRANEXAMIC ACID 10 MG/ML
INJECTION, SOLUTION INTRAVENOUS
Status: DISCONTINUED
Start: 2023-10-10 | End: 2023-10-10 | Stop reason: WASHOUT

## 2023-10-10 RX ORDER — ONDANSETRON 2 MG/ML
4 INJECTION INTRAMUSCULAR; INTRAVENOUS EVERY 6 HOURS PRN
Status: DISCONTINUED | OUTPATIENT
Start: 2023-10-10 | End: 2023-10-12

## 2023-10-10 RX ORDER — ACETAMINOPHEN 500 MG
500 TABLET ORAL EVERY 6 HOURS PRN
Status: DISCONTINUED | OUTPATIENT
Start: 2023-10-10 | End: 2023-10-12

## 2023-10-10 RX ORDER — IBUPROFEN 600 MG/1
600 TABLET ORAL EVERY 6 HOURS
Status: DISCONTINUED | OUTPATIENT
Start: 2023-10-10 | End: 2023-10-12

## 2023-10-10 RX ORDER — BUPIVACAINE HYDROCHLORIDE 2.5 MG/ML
20 INJECTION, SOLUTION EPIDURAL; INFILTRATION; INTRACAUDAL ONCE
Status: DISCONTINUED | OUTPATIENT
Start: 2023-10-10 | End: 2023-10-10 | Stop reason: HOSPADM

## 2023-10-10 RX ORDER — DOCUSATE SODIUM 100 MG/1
100 CAPSULE, LIQUID FILLED ORAL
Status: DISCONTINUED | OUTPATIENT
Start: 2023-10-10 | End: 2023-10-12

## 2023-10-10 RX ORDER — CHOLECALCIFEROL (VITAMIN D3) 25 MCG
1 TABLET,CHEWABLE ORAL DAILY
Status: DISCONTINUED | OUTPATIENT
Start: 2023-10-10 | End: 2023-10-12

## 2023-10-10 RX ORDER — SIMETHICONE 80 MG
80 TABLET,CHEWABLE ORAL 3 TIMES DAILY PRN
Status: DISCONTINUED | OUTPATIENT
Start: 2023-10-10 | End: 2023-10-12

## 2023-10-10 RX ORDER — METHYLERGONOVINE MALEATE 0.2 MG/ML
INJECTION INTRAVENOUS
Status: DISCONTINUED
Start: 2023-10-10 | End: 2023-10-10 | Stop reason: WASHOUT

## 2023-10-10 RX ORDER — DIAPER,BRIEF,INFANT-TODD,DISP
1 EACH MISCELLANEOUS EVERY 6 HOURS PRN
Status: DISCONTINUED | OUTPATIENT
Start: 2023-10-10 | End: 2023-10-12

## 2023-10-10 NOTE — PROGRESS NOTES
Late entry    Pt seen and examined earlier    RN had asked for IUPC/FSE due to difficulty tracing    Cx was 5/100/-2 then.   Both placed under sterile technique w/o diffiuclty    Fht cat 1     Cx now 10/100/+1 after laboring down x 1 hour    Pt started pushing around 1pm    I allowed her to labor down given BAoV    Fht reassuring  Comfortable with epidural  Pt pushing with good effort  Will continue to monitor

## 2023-10-10 NOTE — L&D DELIVERY NOTE
Verito Sanchez [[id number]]      Labor Events     labor?: No   steroids?: Full Course  Antibiotics received during labor?: Yes  Antibiotics (enter # doses in comment): ampicillin (Comment: 2)  Rupture date/time: 10/10/2023 0420     Rupture type: SROM  Fluid color: Clear  Labor type:  Induced Onset of Labor  Induction: Misoprostol, Oxytocin  Indications for induction: Polyhydramnios  Intrapartum & labor complications: Variable decelerations       Labor Length    1st stage: 18h 42m  2nd stage: 1h 52m  3rd stage: 0h 09m       Labor Event Times    Labor onset date/time: 10/9/2023 1800  Dilation complete date/time: 10/10/2023 1242  Start pushing date/time: 10/10/2023 1256        Presentation    Presentation: Vertex  Position: Left Occiput Anterior       Operative Delivery    Operative Vaginal Delivery: No                      Shoulder Dystocia    Shoulder Dystocia: No             Anesthesia    Method: Epidural              [place] Delivery      Head delivery date/time: 10/10/2023 14:34:45   Delivery date/time:  10/10/23 14:34:49   Delivery type: Normal spontaneous vaginal delivery    Details:     Delivery location: delivery room       Delivery Providers    Delivering Clinician: Michael Green DO   Delivery personnel:  Provider Role   Aurelio Portillo, RN Baby Nurse   Jabari Barba, RN Delivery Nurse             Cord    Vessels: 3 Vessels  Complications: None  Timed cord clamping: Yes  Time in sec: 80  Cord blood disposition: to lab  Gases sent?: No       Resuscitation    Method: None        Measurements      Weight: 2948 g 6 lb 8 oz Length: 52 cm     Head circum.: 31 cm                      Placenta    Date/time: 10/10/2023 1444  Removal: Spontaneous  Appearance: Intact  Disposition: Pathology       Apgars    Living status: Living   Apgar Scoring Key:    0 1 2    Skin color Blue or pale Acrocyanotic Completely pink    Heart rate Absent <100 bpm >100 bpm    Reflex irritability No response Grimace Cry or active withdrawal    Muscle tone Limp Some flexion Active motion    Respiratory effort Absent Weak cry; hypoventilation Good, crying              1 Minute:  5 Minute:  10 Minute:  15 Minute:  20 Minute:      Skin color: 1  1       Heart rate: 2  2       Reflex irritablity: 2  2       Muscle tone: 2  2       Respiratory effort: 2  2       Total: 9  9          Apgars assigned by: Susy Murphy RN  Stacy disposition: with mother       Skin to Skin    Skin to skin initiated date/time: 10/10/2023 1434  Skin to skin with: Mother       Vaginal Count    Initial count RN: Tammy Bo RN  Initial count Tech: Samantha Luna    Initial counts 10   0    Final counts 10   2    Final count RN: Tammy Bo RN  Final count MD: Denis Terry DO       Delivery (Maternal)    Episiotomy: None  Perineal lacerations: 2nd Repaired?: Yes   Quantitative blood loss (mL): [facility]    Vaginal Delivery Note    Juilann Rodriguez Patient Status:  Inpatient    3/24/1985 MRN [id number]   Location [address] [place] [address] Attending Deanna Jiménez, [address] [facility] Day # 1 PCP Tammi Hunt.  Nia Webb DO     Delivery     Infant  Date of Delivery: 10/10/2023   Time of Delivery: 2:34 PM  Delivery Type: Normal spontaneous vaginal delivery    Infant Sex  Information for the patient's : Sharla Hadley [[id number]]   male    Infant Birthweight  Information for the patient's : Sharla Hadley [[id number]]   6 lb 8 oz (2.948 kg)     Presentation Vertex [1]  Position Left [1] Occiput [1] Anterior [1]    Apgars:  1 minute: 9               5 minutes: 9                     Neonatologist Present: no      Placenta  Date/Time of Delivery: 10/10/2023  2:44 PM   Delivery: spontaneous  Placenta to Pathology: yes      Cord Gases Submitted: no  Cord Complications: none    Maternal Anesthesia: epidural, local, and IV sedation     Episiotomy/Laceration Repair  2nd degree perineal and left labial repaired with 2/0 and 3/0 vicryl  Multiple hemostatic labial and periurethral abrasions hemostatic w/o need for repair    Delivery Complications  none    Quantitative Blood Loss (mL) 222      EBL:  300 cc    Delivery Comment:     Baby MELLY. Shoulders delivered atraumatically followed by the trunk and LE. Nasopharyngeally bulb suctioned at the perineum and infant vigorous. Cord clamped and cut after 1 min delay. Baby handed to the awaiting nursing personal. Placenta delivered by controlled cord traction intact with a 3 VC and intact. Normal uterine tone with oxytocin infusion. Repair as above with good hemostasis and anatomic re approximation. Sphincter intact. Rectum and vagina clear of sponges.  and patient stable in the delivery room with nursing present. Sponge and needle counts verified.         Patria Nickerson DO   10/10/2023  4:41 PM

## 2023-10-10 NOTE — PLAN OF CARE
Received patient into room 358 at 1715. Bedside shift report received from DAXA Lipscomb Patient transferred to bed from wheelchair, standby assist. Bed in locked and low position. Side rails up X2. Vital signs stable, fundus firm , lochia small, no clots noted. IV site unremarkable. Pain managed. Baby present in open crib. ID bands matched. Patient and family oriented to unit, room and call light within reach. Safety measures in place, POC followed. Problem: POSTPARTUM  Goal: Long Term Goal:Experiences normal postpartum course  Description: INTERVENTIONS:  - Assess and monitor vital signs and lab values. - Assess fundus and lochia. - Provide ice/sitz baths for perineum discomfort. - Monitor healing of incision/episiotomy/laceration, and assess for signs and symptoms of infection and hematoma. - Assess bladder function and monitor for bladder distention.  - Provide/instruct/assist with pericare as needed. - Provide VTE prophylaxis as needed. - Monitor bowel function.  - Encourage ambulation and provide assistance as needed. - Assess and monitor emotional status and provide social service/psych resources as needed. - Utilize standard precautions and use personal protective equipment as indicated. Ensure aseptic care of all intravenous lines and invasive tubes/drains.  - Obtain immunization and exposure to communicable diseases history. Outcome: Progressing  Goal: Optimize infant feeding at the breast  Description: INTERVENTIONS:  - Initiate breast feeding within first hour after birth. - Monitor effectiveness of current breast feeding efforts. - Assess support systems available to mother/family.  - Identify cultural beliefs/practices regarding lactation, letdown techniques, maternal food preferences.   - Assess mother's knowledge and previous experience with breast feeding.  - Provide information as needed about early infant feeding cues (e.g., rooting, lip smacking, sucking fingers/hand) versus late cue of crying.  - Discuss/demonstrate breast feeding aids (e.g., infant sling, nursing footstool/pillows, and breast pumps). - Encourage mother/other family members to express feelings/concerns, and actively listen. - Educate father/SO about benefits of breast feeding and how to manage common lactation challenges. - Recommend avoidance of specific medications or substances incompatible with breast feeding.  - Assess and monitor for signs of nipple pain/trauma. - Instruct and provide assistance with proper latch. - Review techniques for milk expression (breast pumping) and storage of breast milk. Provide pumping equipment/supplies, instructions and assistance, as needed. - Encourage rooming-in and breast feeding on demand.  - Encourage skin-to-skin contact. - Provide LC support as needed. - Assess for and manage engorgement. - Provide breast feeding education handouts and information on community breast feeding support. Outcome: Progressing  Goal: Establishment of adequate milk supply with medication/procedure interruptions  Description: INTERVENTIONS:  - Review techniques for milk expression (breast pumping). - Provide pumping equipment/supplies, instructions, and assistance until it is safe to breastfeed infant. Outcome: Progressing  Goal: Appropriate maternal -  bonding  Description: INTERVENTIONS:  - Assess caregiver- interactions. - Assess caregiver's emotional status and coping mechanisms. - Encourage caregiver to participate in  daily care. - Assess support systems available to mother/family.  - Provide /case management support as needed.   Outcome: Progressing

## 2023-10-11 LAB
ALBUMIN SERPL-MCNC: 1.8 G/DL (ref 3.4–5)
ALBUMIN/GLOB SERPL: 0.4 {RATIO} (ref 1–2)
ALP LIVER SERPL-CCNC: 206 U/L
ALT SERPL-CCNC: 150 U/L
ANION GAP SERPL CALC-SCNC: 9 MMOL/L (ref 0–18)
AST SERPL-CCNC: 119 U/L (ref 15–37)
BASOPHILS # BLD AUTO: 0.05 X10(3) UL (ref 0–0.2)
BASOPHILS NFR BLD AUTO: 0.4 %
BILIRUB SERPL-MCNC: 0.2 MG/DL (ref 0.1–2)
BUN BLD-MCNC: 15 MG/DL (ref 7–18)
BUN/CREAT SERPL: 18.1 (ref 10–20)
CALCIUM BLD-MCNC: 8.9 MG/DL (ref 8.5–10.1)
CHLORIDE SERPL-SCNC: 106 MMOL/L (ref 98–112)
CO2 SERPL-SCNC: 23 MMOL/L (ref 21–32)
CREAT BLD-MCNC: 0.83 MG/DL
DEPRECATED RDW RBC AUTO: 49.2 FL (ref 35.1–46.3)
EGFRCR SERPLBLD CKD-EPI 2021: 93 ML/MIN/1.73M2 (ref 60–?)
EOSINOPHIL # BLD AUTO: 0.09 X10(3) UL (ref 0–0.7)
EOSINOPHIL NFR BLD AUTO: 0.6 %
ERYTHROCYTE [DISTWIDTH] IN BLOOD BY AUTOMATED COUNT: 14.8 % (ref 11–15)
GLOBULIN PLAS-MCNC: 4.8 G/DL (ref 2.8–4.4)
GLUCOSE BLD-MCNC: 93 MG/DL (ref 70–99)
HCT VFR BLD AUTO: 30.9 %
HGB BLD-MCNC: 9.9 G/DL
IMM GRANULOCYTES # BLD AUTO: 0.07 X10(3) UL (ref 0–1)
IMM GRANULOCYTES NFR BLD: 0.5 %
LYMPHOCYTES # BLD AUTO: 3.18 X10(3) UL (ref 1–4)
LYMPHOCYTES NFR BLD AUTO: 22.4 %
MCH RBC QN AUTO: 29.4 PG (ref 26–34)
MCHC RBC AUTO-ENTMCNC: 32 G/DL (ref 31–37)
MCV RBC AUTO: 91.7 FL
MONOCYTES # BLD AUTO: 0.76 X10(3) UL (ref 0.1–1)
MONOCYTES NFR BLD AUTO: 5.4 %
NEUTROPHILS # BLD AUTO: 10.04 X10 (3) UL (ref 1.5–7.7)
NEUTROPHILS # BLD AUTO: 10.04 X10(3) UL (ref 1.5–7.7)
NEUTROPHILS NFR BLD AUTO: 70.7 %
OSMOLALITY SERPL CALC.SUM OF ELEC: 287 MOSM/KG (ref 275–295)
PLATELET # BLD AUTO: 453 10(3)UL (ref 150–450)
POTASSIUM SERPL-SCNC: 3.7 MMOL/L (ref 3.5–5.1)
PROT SERPL-MCNC: 6.6 G/DL (ref 6.4–8.2)
RBC # BLD AUTO: 3.37 X10(6)UL
SODIUM SERPL-SCNC: 138 MMOL/L (ref 136–145)
WBC # BLD AUTO: 14.2 X10(3) UL (ref 4–11)

## 2023-10-11 NOTE — PAYOR COMM NOTE
--------------  ADMISSION REVIEW     Payor: NANCY MARTINEZ  Subscriber #:  [id number]  Authorization Number: [id number]    Admit date: 10/9/23  Admit time: 10:13 AM         History & Physical      Date of Admission:  10/9/2023  HPI:   Leonardo Tompkins is a 41year old  female, current EGA of 37w2d with an estimated date of delivery of: 10/28/2023, by Last Menstrual Period    Leonardo Tompkins is being admitted for induction of labor. Her current obstetrical history is significant for history of PE, homozygous MTHFR, bicuspid aortic valve, uterine fibroid - 6 cm, obesity in pregnancy, rubella non-immune, Covid 19 infection in third trimester ( 23 ), recently diagnosed with IHCP and positive GBS culture. She is Rh positive, serology negative and no HTN, GDM or anemia. She had low risk NIPT- male, normal level 2 and echo, growth US's, last today at 61%, BPP 6/8,  BERNARDO 7.6 cm and no pocket 2 cm wide. Personal communication with Dr Jack Palacios and recs for IOL. Patient reports no complaints .      Fetal Movement: good    History   Obstetric History:   OB History    Para Term  AB Living   2 0 0 0 1 0   SAB IAB Ectopic Multiple Live Births   1 0 0 0 0      # Outcome Date GA Lbr Yoseph/2nd Weight Sex Delivery Anes PTL Lv   2 Current            1 SAB 22 6w0d            Physical Exam:   Pulse:  [128] 128  BP: (136)/(80) 136/80    Constitutional: alert and cooperative in No distress  Abdomen: gravid nontender  Vaginal exam:  Dilation: 0 cm    Effacement: 0 %    Station: ballotable    Position: Cephalic by US today        FHT assessment:   Baseline: 130 bpm   Variability: moderate   Accels:  present   Decels: No   Tocos:  No   Category: 1 tracing    Assessment/Plan:     Oligohydramnios in third trimester    History of pulmonary embolism    Bicuspid aortic valve    Homozygous MTHFR mutation C677T    Rubella non-immune status, antepartum    COVID-19 affecting pregnancy in third trimester    Positive GBS test     Not in labor.    Treatment Plan:  Intervention: Last Heparin at 0730 today. OK for lunch meal and plan Cytotec to start around 1400.          Vaginal Delivery Note     Delivery      Infant  Date of Delivery: 10/10/2023   Time of Delivery: 2:34 PM  Delivery Type: Normal spontaneous vaginal delivery     Infant Sex  Information for the patient's : Hannah Bassett [[id number]]   male     Infant Birthweight  Information for the patient's : Hannah Bassett [[id number]]   6 lb 8 oz (2.948 kg)      Presentation Vertex [1]  Position Left [1] Occiput [1] Anterior [1]     Apgars:  1 minute: 9               5 minutes: 9

## 2023-10-11 NOTE — PLAN OF CARE
Problem: POSTPARTUM  Goal: Optimize infant feeding at the breast  Description: INTERVENTIONS:  - Initiate breast feeding within first hour after birth. - Monitor effectiveness of current breast feeding efforts. - Assess support systems available to mother/family.  - Identify cultural beliefs/practices regarding lactation, letdown techniques, maternal food preferences. - Assess mother's knowledge and previous experience with breast feeding.  - Provide information as needed about early infant feeding cues (e.g., rooting, lip smacking, sucking fingers/hand) versus late cue of crying.  - Discuss/demonstrate breast feeding aids (e.g., infant sling, nursing footstool/pillows, and breast pumps). - Encourage mother/other family members to express feelings/concerns, and actively listen. - Educate father/SO about benefits of breast feeding and how to manage common lactation challenges. - Recommend avoidance of specific medications or substances incompatible with breast feeding.  - Assess and monitor for signs of nipple pain/trauma. - Instruct and provide assistance with proper latch. - Review techniques for milk expression (breast pumping) and storage of breast milk. Provide pumping equipment/supplies, instructions and assistance, as needed. - Encourage rooming-in and breast feeding on demand.  - Encourage skin-to-skin contact. - Provide LC support as needed. - Assess for and manage engorgement. - Provide breast feeding education handouts and information on community breast feeding support. Outcome: Progressing     Problem: POSTPARTUM  Goal: Appropriate maternal -  bonding  Description: INTERVENTIONS:  - Assess caregiver- interactions. - Assess caregiver's emotional status and coping mechanisms. - Encourage caregiver to participate in  daily care. - Assess support systems available to mother/family.  - Provide /case management support as needed.   Outcome: Progressing

## 2023-10-12 ENCOUNTER — TELEPHONE (OUTPATIENT)
Dept: OBGYN CLINIC | Facility: CLINIC | Age: 38
End: 2023-10-12

## 2023-10-12 VITALS
HEART RATE: 74 BPM | WEIGHT: 271 LBS | SYSTOLIC BLOOD PRESSURE: 130 MMHG | OXYGEN SATURATION: 100 % | RESPIRATION RATE: 16 BRPM | TEMPERATURE: 98 F | DIASTOLIC BLOOD PRESSURE: 83 MMHG | BODY MASS INDEX: 48 KG/M2

## 2023-10-12 PROCEDURE — 3E0134Z INTRODUCTION OF SERUM, TOXOID AND VACCINE INTO SUBCUTANEOUS TISSUE, PERCUTANEOUS APPROACH: ICD-10-PCS | Performed by: OBSTETRICS & GYNECOLOGY

## 2023-10-12 RX ORDER — ENOXAPARIN SODIUM 150 MG/ML
1 INJECTION SUBCUTANEOUS DAILY
Qty: 42 EACH | Refills: 0 | Status: SHIPPED | OUTPATIENT
Start: 2023-10-13

## 2023-10-12 RX ORDER — ENOXAPARIN SODIUM 150 MG/ML
1 INJECTION SUBCUTANEOUS DAILY
Qty: 42 EACH | Refills: 0 | Status: SHIPPED | OUTPATIENT
Start: 2023-10-12

## 2023-10-12 NOTE — TELEPHONE ENCOUNTER
Pharmacy calling to clarify directions for Lovenox, states directions state to inject more than what the syringe holds.  Please advise

## 2023-10-12 NOTE — LACTATION NOTE
10/12/23 0800   Evaluation Type   Evaluation Type Inpatient   Problems identified   Problems identified Knowledge deficit;Milk supply not WNL   Milk supply not WNL Reduced (potential)   Problems Identified Other 37+3, continued latch difficulty, supplementation started to initiate stool output   Maternal history   Maternal history AMA; Induction of labor   Other/comment GBBS+   Oligohydrmanios   Breastfeeding goal   Breastfeeding goal To maintain breast milk feeding per patient goal   Maternal Assessment   Prior breastfeeding experience (comment below) Primip   Breastfeeding Assistance LC assistance declined at this time   Guidelines for use of:   Breast pump type Medela Pump In Style MaxFlow;Hand Pump   Current use of pump: with supplementation   Suggested use of pump Pump if infant is not latching to breast;Pump each time a supplement is offered; For comfort as needed   Reported pumping volumes (ml) drops   Other (comment) Anticipated discharge home today, reviewed process of milk production, plan is to pump with double electric Medela PIS once home, currently using Medela manual pump.  Encouraged hospital grade pump rental if milk supply concerns arise, equipment provided

## 2023-10-12 NOTE — TELEPHONE ENCOUNTER
PP pt with hx PE, delivered 10/10/23. Pharmacy calling for clarification on Lovenox. Directions state inject 123 mg total into skin daily, however 1 syringe only contains 120 mg. To [facility] to advise on sig.

## 2023-10-15 ENCOUNTER — HOSPITAL ENCOUNTER (EMERGENCY)
Facility: HOSPITAL | Age: 38
Discharge: HOME | End: 2023-10-15
Attending: EMERGENCY MEDICINE
Payer: COMMERCIAL

## 2023-10-15 VITALS
DIASTOLIC BLOOD PRESSURE: 82 MMHG | HEART RATE: 77 BPM | BODY MASS INDEX: 48.01 KG/M2 | WEIGHT: 270.94 LBS | HEIGHT: 62.99 IN | OXYGEN SATURATION: 95 % | TEMPERATURE: 98 F | SYSTOLIC BLOOD PRESSURE: 132 MMHG | RESPIRATION RATE: 18 BRPM

## 2023-10-15 DIAGNOSIS — N93.9 VAGINAL BLEEDING: Primary | ICD-10-CM

## 2023-10-15 LAB
ANION GAP SERPL CALC-SCNC: 6 MMOL/L (ref 0–18)
APTT PPP: 30.5 SECONDS (ref 23.3–35.6)
BASOPHILS # BLD AUTO: 0.06 X10(3) UL (ref 0–0.2)
BASOPHILS NFR BLD AUTO: 0.8 %
BUN BLD-MCNC: 10 MG/DL (ref 7–18)
BUN/CREAT SERPL: 15.2 (ref 10–20)
CALCIUM BLD-MCNC: 9 MG/DL (ref 8.5–10.1)
CHLORIDE SERPL-SCNC: 107 MMOL/L (ref 98–112)
CO2 SERPL-SCNC: 27 MMOL/L (ref 21–32)
CREAT BLD-MCNC: 0.66 MG/DL
DEPRECATED RDW RBC AUTO: 51.4 FL (ref 35.1–46.3)
EGFRCR SERPLBLD CKD-EPI 2021: 116 ML/MIN/1.73M2 (ref 60–?)
EOSINOPHIL # BLD AUTO: 0.14 X10(3) UL (ref 0–0.7)
EOSINOPHIL NFR BLD AUTO: 1.8 %
ERYTHROCYTE [DISTWIDTH] IN BLOOD BY AUTOMATED COUNT: 15 % (ref 11–15)
GLUCOSE BLD-MCNC: 113 MG/DL (ref 70–99)
HCT VFR BLD AUTO: 31.1 %
HGB BLD-MCNC: 9.6 G/DL
IMM GRANULOCYTES # BLD AUTO: 0.09 X10(3) UL (ref 0–1)
IMM GRANULOCYTES NFR BLD: 1.2 %
INR BLD: 0.88 (ref 0.8–1.2)
LYMPHOCYTES # BLD AUTO: 1.75 X10(3) UL (ref 1–4)
LYMPHOCYTES NFR BLD AUTO: 22.5 %
MCH RBC QN AUTO: 28.8 PG (ref 26–34)
MCHC RBC AUTO-ENTMCNC: 30.9 G/DL (ref 31–37)
MCV RBC AUTO: 93.4 FL
MONOCYTES # BLD AUTO: 0.63 X10(3) UL (ref 0.1–1)
MONOCYTES NFR BLD AUTO: 8.1 %
NEUTROPHILS # BLD AUTO: 5.12 X10 (3) UL (ref 1.5–7.7)
NEUTROPHILS # BLD AUTO: 5.12 X10(3) UL (ref 1.5–7.7)
NEUTROPHILS NFR BLD AUTO: 65.6 %
OSMOLALITY SERPL CALC.SUM OF ELEC: 290 MOSM/KG (ref 275–295)
PLATELET # BLD AUTO: 511 10(3)UL (ref 150–450)
POTASSIUM SERPL-SCNC: 3.8 MMOL/L (ref 3.5–5.1)
PROTHROMBIN TIME: 12.4 SECONDS (ref 11.6–14.8)
RBC # BLD AUTO: 3.33 X10(6)UL
SODIUM SERPL-SCNC: 140 MMOL/L (ref 136–145)
WBC # BLD AUTO: 7.8 X10(3) UL (ref 4–11)

## 2023-10-15 PROCEDURE — 85610 PROTHROMBIN TIME: CPT | Performed by: EMERGENCY MEDICINE

## 2023-10-15 PROCEDURE — 36415 COLL VENOUS BLD VENIPUNCTURE: CPT

## 2023-10-15 PROCEDURE — 99283 EMERGENCY DEPT VISIT LOW MDM: CPT

## 2023-10-15 PROCEDURE — 85730 THROMBOPLASTIN TIME PARTIAL: CPT | Performed by: EMERGENCY MEDICINE

## 2023-10-15 PROCEDURE — 85025 COMPLETE CBC W/AUTO DIFF WBC: CPT | Performed by: EMERGENCY MEDICINE

## 2023-10-15 PROCEDURE — 80048 BASIC METABOLIC PNL TOTAL CA: CPT | Performed by: EMERGENCY MEDICINE

## 2023-10-15 NOTE — ED INITIAL ASSESSMENT (HPI)
Pt from home to ED via triage for evaluation of vaginal bleeding. 5 days ago pt gave birth vaginally, bleeding was minimal upon discharge, increased the past few days, pt reports passing egg-sized clot just PTA to ED. Pt with hx of blood clots, takes Lovenox.

## 2023-10-16 ENCOUNTER — PATIENT OUTREACH (OUTPATIENT)
Dept: CASE MANAGEMENT | Age: 38
End: 2023-10-16

## 2023-10-16 ENCOUNTER — TELEPHONE (OUTPATIENT)
Dept: OBGYN CLINIC | Facility: CLINIC | Age: 38
End: 2023-10-16

## 2023-10-16 NOTE — TELEPHONE ENCOUNTER
Pt delivered on 10/10, on Sunday morning passed a big clot, was told by ED to call and follow up with OB.  Please advise

## 2023-10-16 NOTE — TELEPHONE ENCOUNTER
PP pt seen in ER for passing egg size clot yesterday. Pt scheduled for PP ER follow-up tomorrow at 11:30 with KATELIN hwang. Pt reports bleeding has tapered off dramatically since passing clot yesterday. Bleeding and pain precautions reviewed, she will monitor.

## 2023-10-16 NOTE — PROGRESS NOTES
1st attempt ER f/up apt request  OBGYN -existing apt (10/17)  PCP -decline, pt will f/up w/ OBGYN   Closing encounter

## 2023-10-17 ENCOUNTER — TELEPHONE (OUTPATIENT)
Dept: HEMATOLOGY/ONCOLOGY | Facility: HOSPITAL | Age: 38
End: 2023-10-17

## 2023-10-17 ENCOUNTER — POSTPARTUM (OUTPATIENT)
Dept: OBGYN CLINIC | Facility: CLINIC | Age: 38
End: 2023-10-17
Payer: COMMERCIAL

## 2023-10-17 VITALS
DIASTOLIC BLOOD PRESSURE: 82 MMHG | WEIGHT: 254.63 LBS | HEART RATE: 79 BPM | BODY MASS INDEX: 45 KG/M2 | SYSTOLIC BLOOD PRESSURE: 131 MMHG

## 2023-10-17 PROCEDURE — 3075F SYST BP GE 130 - 139MM HG: CPT | Performed by: OBSTETRICS & GYNECOLOGY

## 2023-10-17 PROCEDURE — 99213 OFFICE O/P EST LOW 20 MIN: CPT | Performed by: OBSTETRICS & GYNECOLOGY

## 2023-10-17 PROCEDURE — 3079F DIAST BP 80-89 MM HG: CPT | Performed by: OBSTETRICS & GYNECOLOGY

## 2023-10-17 NOTE — TELEPHONE ENCOUNTER
Patient says she is scheduled to see  for what would have been her final appt before she gave birth, but she ended up having her baby last week. She is wondering if this appointment is still needed.

## 2023-10-17 NOTE — TELEPHONE ENCOUNTER
Called and spoke with patient. Let her know that Dr. Nas Brown still wants to see her for a follow up tomorrow. She stated understanding and will come tomorrow.

## 2023-10-18 ENCOUNTER — APPOINTMENT (OUTPATIENT)
Dept: HEMATOLOGY/ONCOLOGY | Facility: HOSPITAL | Age: 38
End: 2023-10-18
Attending: INTERNAL MEDICINE
Payer: COMMERCIAL

## 2023-10-19 NOTE — PROGRESS NOTES
HPI:  The patient is a 42 yo F s/p  on 10/10/23. H/o DVT on therapeutic lovenox 120mg daily per heme/onc. Passed larger clot over the weekend that made her nervous,prompting ER eval.  Hgb similar to discharge, VSS, and exam wnl in ER. Only light bleeding now. NO pain or foul smelling dc. Reviewed medical and surgical history below       OBSTETRICS HISTORY:  OB History     T1    L1    SAB1  IAB0  Ectopic0  Multiple0  Live Births1     GYNE HISTORY:  Sexual activity:   Yes      Partners:   Male      Birth control/ protection:   Paragard     Menarche: 16years old       MEDICAL HISTORY:  Past Medical History:   Diagnosis Date    Amaurosis fugax of right eye 2020    Ankle swelling     Pain  Neg Doppler    Blood disorder     Lacunar infarction ([facility] [address] 2020    Moderate aortic regurgitation 2020    Pulmonary embolism ([facility] [address] 2013    no associated DVT's believed from OCP; tx with 6 months Coumadin    Transient homonymous hemianopia 2020       SURGICAL HISTORY:  Past Surgical History:   Procedure Laterality Date    WISDOM TEETH REMOVED      no associated bleeding complications       SOCIAL HISTORY:  Social History    Socioeconomic History      Marital status:       Spouse name: Not on file      Number of children: Not on file      Years of education: Not on file      Highest education level: Not on file    Occupational History      Not on file    Tobacco Use      Smoking status: Never      Smokeless tobacco: Never    Vaping Use      Vaping Use: Never used    Substance and Sexual Activity      Alcohol use: Yes        Comment: Occasionally. Stopped when found out was pregnant      Drug use: Not Currently        Types: Cannabis        Comment: RARELY.  Chew gummies up until found out was pregnant      Sexual activity: Yes        Partners: Male        Birth control/protection: Paragard    Other Topics      Concerns:         Service: Not Asked        Blood Transfusions: Not Asked        Caffeine Concern: Yes          coffee daily         Occupational Exposure: Not Asked        Hobby Hazards: Not Asked        Sleep Concern: Not Asked        Stress Concern: Not Asked        Weight Concern: Not Asked        Special Diet: Not Asked        Back Care: Not Asked        Exercise: Yes          3 times per week        Bike Helmet: Not Asked        Seat Belt: Not Asked        Self-Exams: Not Asked    Social History Narrative      Nu Murphy is  x 4 yrs. She has children. Patient is an . She lives with her  in [place], [place]. The patient does not use an assistive device. The patient does not live in a home with stairs. Social Determinants of Health  Financial Resource Strain: Low Risk  (10/9/2023)      Financial Resource Strain          Difficulty of Paying Living Expenses: Not hard at all          Med Affordability: No  Food Insecurity: No Food Insecurity (10/9/2023)      Food Insecurity          Food Insecurity: Never true  Transportation Needs: No Transportation Needs (10/9/2023)      Transportation Needs          Lack of Transportation: No  Stress: No Stress Concern Present (10/9/2023)      Stress          Feeling of Stress : No  Housing Stability: Low Risk  (10/9/2023)      Housing Stability          Housing Instability: No          Housing Instability Emergency: Not on file    FAMILY HISTORY:  Family History   Problem Relation Age of Onset    Hypertension Mother         Controlled    Bleeding Disorders Neg     DVT/VTE Neg     Cancer Neg        MEDICATIONS:    Current Outpatient Medications:     enoxaparin 120 MG/0.8ML Injection Solution Prefilled Syringe, Inject 0.82 mL (123 mg total) into the skin daily. , Disp: 42 each, Rfl: 0    enoxaparin (LOVENOX) 120 MG/0.8ML Injection Solution Prefilled Syringe, Inject 0.82 mL (123 mg total) into the skin daily. , Disp: 42 each, Rfl: 0    ferrous sulfate 325 (65 FE) MG Oral Tab EC, Take 1 tablet (325 mg total) by mouth daily with breakfast., Disp: , Rfl:     prenatal vitamin with DHA 27-0.8-228 MG Oral Cap, Take 1 capsule by mouth daily. , Disp: , Rfl:     Alcohol Swabs Does not apply Pads, Use as needed prior to injections, Disp: 100 each, Rfl: 1    BD [employer] Does not apply Misc, For needle and syringe disposal, Disp: 1 each, Rfl: PRN    ALLERGIES:  No Known Allergies      Review of Systems:  Constitutional:  Denies fevers and chills   Cardiovascular:  denies chest pain or palpitations  Respiratory:  denies shortness of breath  Gastrointestinal:  denies heartburn, abdominal pain, diarrhea or constipation  Genitourinary:  denies dysuria, incontinence, abnormal vaginal discharge, vaginal itching  Musculoskeletal:  denies back pain. Skin/Breast:  Denies any breast pain, lumps, or discharge. Neurological:  denies headaches, extremity weakness  Psychiatric: denies depression or anxiety. 10/17/23  1140   BP: 131/82   Pulse: 79       PHYSICAL EXAM:   Constitutional: well developed, well nourished  Head/Face: normocephalic  Psychiatric: Appropriate mood and affect    Pelvic Exam:  External Genitalia: normal appearance, hair distribution, and no lesions  Urethral Meatus:  normal in size, location, without lesions and prolapse  Bladder:  No fullness, masses or tenderness  Vagina:  Normal appearance without lesions, no abnormal discharge. Scant lochia  Perineum: normal; suture line intact. No signs of infection    Assessment/Plan:    Noah Sharpe was seen today for postpartum care. Diagnoses and all orders for this visit:    Other immediate postpartum hemorrhage      Pt s/p ER visit for concering PP bleeding. Pt showed me picture of clot she had pased which was about egg sized. I explained we are looking for soaking pads as a concern. Bleeding now light. Suture line intact. Discussed continuing lovenox and reviewed indication per Dr Covington Current of needing therapeutic lovenox. All questions answered. To f/u for PPV

## 2023-11-01 ENCOUNTER — OFFICE VISIT (OUTPATIENT)
Dept: HEMATOLOGY/ONCOLOGY | Facility: HOSPITAL | Age: 38
End: 2023-11-01
Attending: INTERNAL MEDICINE
Payer: COMMERCIAL

## 2023-11-01 VITALS
HEIGHT: 64.25 IN | SYSTOLIC BLOOD PRESSURE: 128 MMHG | HEART RATE: 75 BPM | WEIGHT: 250.81 LBS | OXYGEN SATURATION: 95 % | RESPIRATION RATE: 16 BRPM | DIASTOLIC BLOOD PRESSURE: 84 MMHG | TEMPERATURE: 99 F | BODY MASS INDEX: 42.82 KG/M2

## 2023-11-01 DIAGNOSIS — D75.839 THROMBOCYTOSIS: ICD-10-CM

## 2023-11-01 DIAGNOSIS — Z86.711 HISTORY OF PULMONARY EMBOLISM: ICD-10-CM

## 2023-11-01 DIAGNOSIS — Z79.01 CURRENT USE OF ANTICOAGULANT THERAPY: Primary | ICD-10-CM

## 2023-11-01 PROCEDURE — 99214 OFFICE O/P EST MOD 30 MIN: CPT | Performed by: INTERNAL MEDICINE

## 2023-11-01 RX ORDER — ENOXAPARIN SODIUM 150 MG/ML
1 INJECTION SUBCUTANEOUS DAILY
Qty: 21 EACH | Refills: 0 | Status: SHIPPED | OUTPATIENT
Start: 2023-11-01

## 2023-11-07 ENCOUNTER — TELEPHONE (OUTPATIENT)
Dept: HEMATOLOGY/ONCOLOGY | Facility: HOSPITAL | Age: 38
End: 2023-11-07

## 2023-11-07 NOTE — TELEPHONE ENCOUNTER
Called and spoke with Urmila Rodriguez, pharmacist at [facility]. Told him our office received a fax needing prescription clarification re: lovenox prescription. Verified with BEAN Ervin about dose and direction instructions for the lovenox should be inject 0.8 mL into the skin daily. He stated understanding and will update the prescription information now.

## 2023-11-15 ENCOUNTER — TELEPHONE (OUTPATIENT)
Dept: OBGYN UNIT | Facility: HOSPITAL | Age: 38
End: 2023-11-15

## 2023-11-21 ENCOUNTER — POSTPARTUM (OUTPATIENT)
Dept: OBGYN CLINIC | Facility: CLINIC | Age: 38
End: 2023-11-21

## 2023-11-21 VITALS
SYSTOLIC BLOOD PRESSURE: 110 MMHG | DIASTOLIC BLOOD PRESSURE: 77 MMHG | BODY MASS INDEX: 42 KG/M2 | WEIGHT: 247 LBS | HEART RATE: 75 BPM

## 2023-11-21 PROCEDURE — 3074F SYST BP LT 130 MM HG: CPT | Performed by: OBSTETRICS & GYNECOLOGY

## 2023-11-21 PROCEDURE — 3078F DIAST BP <80 MM HG: CPT | Performed by: OBSTETRICS & GYNECOLOGY

## 2023-11-21 NOTE — PROGRESS NOTES
HPI    Turner Mays is a 41year old female  here for 6 week post-partum visit. Patient is breast & bottle feeding. Patient denies symptoms of depression, [place]  depression score below. Bonding well with baby. NO SI/HI. Moving bowels and bladder w/o issues. Lochia resolved. [place]  DEPRESSION SCALE  I have been able to laugh and see the funny side of things: As much as I always could  I have looked forward with enjoyment to things: As much as I ever did  I have been anxious or worried for no good reason: No, not at all  I have felt scared or panicky for no good reason: No, not much  Things have been getting on top of me: No, I have been coping as well as ever  I have been so unhappy that I have had difficulty sleeping: Not at all  I have felt sad or miserable: No, not at all  I have been so unhappy that I have been crying: No, never  The thought of harming myself has occurred to me: Never  Total: 1    OBSTETRIC HISTORY  OB History    Para Term  AB Living   2 1 1 0 1 1   SAB IAB Ectopic Multiple Live Births   1 0 0 0 1      # Outcome Date GA Lbr Yoseph/2nd Weight Sex Delivery Anes PTL Lv   2 Term 10/10/23 37w3d 18:42 / 01:52 6 lb 8 oz (2.948 kg) M NORMAL SPONT EPI N [place]      Complications: Variable decelerations   1 SAB 22 6w0d              GYNE HISTORY   Menarche: 16years old     MEDICATIONS:    Current Outpatient Medications:     enoxaparin (LOVENOX) 120 MG/0.8ML Injection Solution Prefilled Syringe, Inject 0.82 mL (123 mg total) into the skin daily. , Disp: 21 each, Rfl: 0    ferrous sulfate 325 (65 FE) MG Oral Tab EC, Take 1 tablet (325 mg total) by mouth daily with breakfast. (Patient not taking: Reported on 2023), Disp: , Rfl:     prenatal vitamin with DHA 27-0.8-228 MG Oral Cap, Take 1 capsule by mouth daily. , Disp: , Rfl:     Alcohol Swabs Does not apply Pads, Use as needed prior to injections, Disp: 100 each, Rfl: 1    BD [employer] Does not apply Misc, For needle and syringe disposal, Disp: 1 each, Rfl: PRN    ALLERGIES:  No Known Allergies    PHYSICAL EXAM  Blood pressure 110/77, pulse 75, weight 247 lb (112 kg), last menstrual period 01/21/2023, currently breastfeeding. General:  Well nourished, well developed woman in no acute distress  Abdomen:  soft, nontender, no masses  External Genitalia: normal appearance, hair distribution, and no lesions  Vagina:  Normal appearance without lesions, no abnormal discharge; scant lochia  Cervix:  Normal without tenderness on motion  Uterus: normal in size, contour, position, mobility, without tenderness  Adnexa: normal without masses or tenderness  Perineum: well healed perineum  Anus: no hemorroids       ASSESSMENT/PLAN  Luan Johnson was seen today for postpartum care. Diagnoses and all orders for this visit:    Postpartum state      Discussed all options of birthcontrol including ocps, minipill, Mirena or Paragard IUD, nuvaring, orthoevra patch, nexplanon, Depoprovera, condoms or tubal sterilization options. Patient has chosen condom. Patient to return for annual gyne exam in 6 months.   Finishing lovenox--has had f/u with Dr Tiffany Silverman already and timing of completion d/w pt

## 2023-12-03 ENCOUNTER — MOBILE ENCOUNTER (OUTPATIENT)
Dept: ANESTHESIOLOGY | Facility: HOSPITAL | Age: 38
End: 2023-12-03

## 2023-12-04 ENCOUNTER — ANESTHESIA EVENT (OUTPATIENT)
Dept: ANESTHESIOLOGY | Facility: HOSPITAL | Age: 38
End: 2023-12-04

## 2023-12-04 ENCOUNTER — ANESTHESIA (OUTPATIENT)
Dept: ANESTHESIOLOGY | Facility: HOSPITAL | Age: 38
End: 2023-12-04

## 2023-12-04 NOTE — ANESTHESIA POSTPROCEDURE EVALUATION
Patient: Marlon Lomeli    Procedure Summary       Date: 10/10/23 Room / Location:     Anesthesia Start: 0510 Anesthesia Stop: 4748    Procedure: LABOR ANALGESIA Diagnosis:     Scheduled Providers:  Anesthesiologist: Denisse Faith MD    Anesthesia Type: epidural ASA Status: 2            Anesthesia Type: epidural    Vitals Value Taken Time   /67 10/10/23   Temp 98 10/10/23   Pulse 67 10/10/23   Resp 12 10/10/23   SpO2 99 10/10/23       [facility] AN Post Evaluation:   Patient Evaluated in floor  Patient Participation: complete - patient participated  Level of Consciousness: awake  Pain Management: adequate  Airway Patency:patent  Dental exam unchanged from preop  Yes    Cardiovascular Status: acceptable  Respiratory Status: acceptable  Postoperative Hydration acceptable      Deborah Parrish MD  12/4/2023 9:29 AM

## 2023-12-04 NOTE — ANESTHESIA PROCEDURE NOTES
Labor Analgesia    Date/Time: 10/10/2023 5:10 AM    Performed by: Kendra Conway MD  Authorized by: Kendra Conway MD      General Information and Staff    Start Time:  10/10/2023 5:10 AM  End Time:  10/10/2023 5:15 AM  Anesthesiologist:  Kendra Conway MD  Performed by:   Anesthesiologist  Patient Location:  OB  Reason for Block: labor epidural    Preanesthetic Checklist: patient identified, IV checked, risks and benefits discussed, monitors and equipment checked, pre-op evaluation, timeout performed, anesthesia consent and sterile technique used      Procedure Details    Patient Position:  Sitting  Prep: ChloraPrep    Monitoring:  Heart rate  Approach:  Midline    Epidural Needle    Injection Technique:  JACOBO air  Needle Type:  Tuohy  Needle Gauge:  18 G  Needle Length:  3.5 in  Location:  L2-3    Spinal Needle      Catheter    Catheter Type:  Multi-orifice  Catheter Size:  20 G  Test Dose:  Negative    Assessment      Additional Comments

## 2023-12-07 ENCOUNTER — TELEPHONE (OUTPATIENT)
Dept: OBGYN CLINIC | Facility: CLINIC | Age: 38
End: 2023-12-07

## 2023-12-07 NOTE — TELEPHONE ENCOUNTER
Okay for hospital pump rx and should be also doing warm compresses with massage.   If she is stopping early and formula feeding, then she needs to pump that breast afterwards to ensure complete deflation

## 2023-12-07 NOTE — TELEPHONE ENCOUNTER
Pt informed of recs below. Pt states she will check with her insurance to see if they will cover a hospital grade pump and how she can get it. Pt will call back with info from insurance company. Pt will watch out for symptoms of mastitis as well.

## 2023-12-07 NOTE — TELEPHONE ENCOUNTER
Pt calling to report that her left breast is still sore/tender. Pt states she saw [address] [place] [address] on 11/21 for PP visit and he stated that if it happened again to notify the office. Pt states she thinks she \"passed it 2 days ago\", but still is not pumping as much as she use to. Pt states she use to pump 2-3 oz of milk per breast, but now is only able to pump 1 oz of milk on left breast after 20 minutes of pumping. Pt denies having any fevers, chills or night sweats  Denies N/v/d    Pt advised to apply warm compresses, stay well hydrated with at least 64 oz water daily, and to continue to breastfeed and pump. RN explained that breastfeeding will be the most effective method to release milk if able. Pt agreed and states she is breastfeeding, but her baby gets desperate and fussy so she will then bottle feed. Pt asking if she may need a hospital grade pump to help release milk more effectively. Pt made aware message will be routed to [address] [place] [address] to review for further recs. Message to [address] [place] [address] to please review and advise. Thank you.

## 2024-01-07 ENCOUNTER — PATIENT MESSAGE (OUTPATIENT)
Dept: OBGYN CLINIC | Facility: CLINIC | Age: 39
End: 2024-01-07

## 2024-01-08 RX ORDER — BREAST PUMP
EACH MISCELLANEOUS
Qty: 1 EACH | Refills: 1 | Status: SHIPPED | OUTPATIENT
Start: 2024-01-08

## 2024-01-08 RX ORDER — BREAST PUMP
EACH MISCELLANEOUS
Qty: 1 EACH | Refills: 0 | Status: SHIPPED | OUTPATIENT
Start: 2024-01-08

## 2024-01-08 NOTE — TELEPHONE ENCOUNTER
From: Cora Melendez  To: Dawson Manriquez  Sent: 1/7/2024 8:10 PM CST  Subject: Hospital Grade Breast Pump    Hello,   Several weeks ago I called and spoke to a nurse regarding a couple of clogged ducts I have had. The nurse informed me that Dr. Manriquez would like to give me a prescription for a hospital grade breast pump. I informed the nurse I would speak to my insurance to see if they covered it. Luckily my insurance does cover the hospital grade breast pump.    I also spoke to the company that will be providing the breast pump and in order for my insurance to cover it and be billed they will need the prescription faxed over to them from my doctors office.     The prescription needs to be faxed over to [phone]. Please be sure the prescription states its for a HOSPITAL GRADE BREAST PUMP. If the prescription does not state that then the insurance will not be able to cover it.     Please let me know if you have any questions and when the prescription has been faxed over.     Thank you!  Cora Melendez

## 2024-01-08 NOTE — TELEPHONE ENCOUNTER
Breast pump order re faxed to [facility] to reflect hospital grade breast pump. Pt informed.    Patient

## 2024-01-31 ENCOUNTER — LAB ENCOUNTER (OUTPATIENT)
Dept: LAB | Age: 39
End: 2024-01-31
Attending: INTERNAL MEDICINE
Payer: COMMERCIAL

## 2024-01-31 DIAGNOSIS — D75.839 THROMBOCYTOSIS: ICD-10-CM

## 2024-01-31 LAB
BASOPHILS # BLD AUTO: 0.07 X10(3) UL (ref 0–0.2)
BASOPHILS NFR BLD AUTO: 0.8 %
EOSINOPHIL # BLD AUTO: 0.15 X10(3) UL (ref 0–0.7)
EOSINOPHIL NFR BLD AUTO: 1.7 %
ERYTHROCYTE [DISTWIDTH] IN BLOOD BY AUTOMATED COUNT: 14.6 %
HCT VFR BLD AUTO: 38.8 %
HGB BLD-MCNC: 11.6 G/DL
IMM GRANULOCYTES # BLD AUTO: 0.02 X10(3) UL (ref 0–1)
IMM GRANULOCYTES NFR BLD: 0.2 %
LYMPHOCYTES # BLD AUTO: 2.96 X10(3) UL (ref 1–4)
LYMPHOCYTES NFR BLD AUTO: 33.4 %
MCH RBC QN AUTO: 26.3 PG (ref 26–34)
MCHC RBC AUTO-ENTMCNC: 29.9 G/DL (ref 31–37)
MCV RBC AUTO: 88 FL
MONOCYTES # BLD AUTO: 0.72 X10(3) UL (ref 0.1–1)
MONOCYTES NFR BLD AUTO: 8.1 %
NEUTROPHILS # BLD AUTO: 4.94 X10 (3) UL (ref 1.5–7.7)
NEUTROPHILS # BLD AUTO: 4.94 X10(3) UL (ref 1.5–7.7)
NEUTROPHILS NFR BLD AUTO: 55.8 %
PLATELET # BLD AUTO: 518 10(3)UL (ref 150–450)
RBC # BLD AUTO: 4.41 X10(6)UL
WBC # BLD AUTO: 8.9 X10(3) UL (ref 4–11)

## 2024-01-31 PROCEDURE — 85025 COMPLETE CBC W/AUTO DIFF WBC: CPT

## 2024-02-01 ENCOUNTER — OFFICE VISIT (OUTPATIENT)
Dept: HEMATOLOGY/ONCOLOGY | Facility: HOSPITAL | Age: 39
End: 2024-02-01
Attending: INTERNAL MEDICINE
Payer: COMMERCIAL

## 2024-02-01 VITALS
HEIGHT: 64.25 IN | WEIGHT: 253.81 LBS | SYSTOLIC BLOOD PRESSURE: 119 MMHG | BODY MASS INDEX: 43.33 KG/M2 | TEMPERATURE: 98 F | DIASTOLIC BLOOD PRESSURE: 65 MMHG | RESPIRATION RATE: 16 BRPM | HEART RATE: 79 BPM | OXYGEN SATURATION: 98 %

## 2024-02-01 DIAGNOSIS — Z86.711 HISTORY OF PULMONARY EMBOLISM: ICD-10-CM

## 2024-02-01 DIAGNOSIS — D75.839 THROMBOCYTOSIS: Primary | ICD-10-CM

## 2024-02-01 PROCEDURE — 99213 OFFICE O/P EST LOW 20 MIN: CPT | Performed by: INTERNAL MEDICINE

## 2024-02-01 NOTE — PROGRESS NOTES
Hematology Progress Note    Patient Name: Cora Melendez   YOB: 1985   Medical Record Number: [id number]   CSN: [id number]  Consulting Physician: Brayan March MD  Referring Physician(s): Mayito Fuller DO  Date of Visit: 2024    Chief Complaint:  History of Pulmonary Embolism, Elevated Platelets    History of Present Illness:     Cora Melendez is a 38 year old female that was seen today in the hematology suite for evaluation of the above as the patient is currently pregnant. She has PMH relevant for bilateral pulmonary emboli at at a young age due to OCP's in . Patient is a  currently at 10 weeks and 4 days gestation on lovenox 40 mg/daily without any bleeding complications and minor bruising at the site of injection. Cora reports the pregnancy has been going well without any complications. She denies any recurrence of PE symptoms or DVT symptoms. Patient has some mild thrombocytosis noted but denies reports of blood loss from any orifice.  She reports adherence to her prenatal vitamin.  Patient is without any systemic signs of illness.  Her review of systems is negative for any other concerns.    Interval History:  Patient presents s/p planned 6 wks of planned post partem lovenox prophylaxis s/p delivery of her healthy baby boy on 10/10/2023. She denies any bleeding or bruising with lovenox 1mg/kg daily for prevention. Cora denies any DVT or PE symptoms. Cora has not been taking iron tablets as planned and has not had return of her menstrual cycle. She has no new concerns on ROS.    Past Medical History:  Past Medical History:   Diagnosis Date    Amaurosis fugax of right eye 2020    Ankle swelling     Pain  Neg Doppler    Blood disorder     Lacunar infarction (HCC) 2020    Moderate aortic regurgitation 2020    Pulmonary embolism (HCC) 2013    no associated DVT's believed from OCP; tx with 6 months Coumadin    Transient homonymous hemianopia 2020        Past Surgical History:  Past Surgical History:   Procedure Laterality Date    WISDOM TEETH REMOVED  2005    no associated bleeding complications       Family Medical History:  Family History   Problem Relation Age of Onset    Hypertension Mother         Controlled    Bleeding Disorders Neg     DVT/VTE Neg     Cancer Neg        Social History:  Social History     Socioeconomic History    Marital status:      Spouse name: Not on file    Number of children: Not on file    Years of education: Not on file    Highest education level: Not on file   Occupational History    Not on file   Tobacco Use    Smoking status: Never    Smokeless tobacco: Never   Vaping Use    Vaping Use: Never used   Substance and Sexual Activity    Alcohol use: Yes     Comment: Occasionally. Stopped when found out was pregnant    Drug use: Not Currently     Types: Cannabis     Comment: RARELY. Chew gummies up until found out was pregnant    Sexual activity: Yes     Partners: Male     Birth control/protection: Paragard   Other Topics Concern     Service Not Asked    Blood Transfusions Not Asked    Caffeine Concern Yes     Comment: coffee daily     Occupational Exposure Not Asked    Hobby Hazards Not Asked    Sleep Concern Not Asked    Stress Concern Not Asked    Weight Concern Not Asked    Special Diet Not Asked    Back Care Not Asked    Exercise Yes     Comment: 3 times per week    Bike Helmet Not Asked    Seat Belt Not Asked    Self-Exams Not Asked   Social History Narrative    Cora is  x 4 yrs. She has children. Patient is an . She lives with her  in [place], [place]. The patient does not use an assistive device.The patient does not live in a home with stairs.     Social Determinants of Health     Financial Resource Strain: Low Risk  (10/9/2023)    Financial Resource Strain     Difficulty of Paying Living Expenses: Not hard at all     Med Affordability: No   Food Insecurity: No Food Insecurity  (10/9/2023)    Food Insecurity     Food Insecurity: Never true   Transportation Needs: No Transportation Needs (10/9/2023)    Transportation Needs     Lack of Transportation: No   Stress: No Stress Concern Present (10/9/2023)    Stress     Feeling of Stress : No   Housing Stability: Low Risk  (10/9/2023)    Housing Stability     Housing Instability: No     Housing Instability Emergency: Not on file       Allergies:   No Known Allergies    Current Medications:   Misc. Devices (BREAST PUMP) Does not apply Misc Double electric breast pump e0603 1 each 1    Misc. Devices (BREAST PUMP) Does not apply Misc Hospital grade breast pump    1 each 0    ferrous sulfate 325 (65 FE) MG Oral Tab EC Take 1 tablet (325 mg total) by mouth daily with breakfast.      prenatal vitamin with DHA 27-0.8-228 MG Oral Cap Take 1 capsule by mouth daily.      Alcohol Swabs Does not apply Pads Use as needed prior to injections 100 each 1    BD [employer] Does not apply Misc For needle and syringe disposal 1 each PRN       Review of Systems:    Constitutional: Negative for anorexia, chills, fatigue, fevers, night sweats and weight loss.  Eyes: Negative for visual disturbance, irritation and redness.  Respiratory: Negative for cough, hemoptysis, chest pain, or dyspnea on exertion.  Gastrointestinal: Negative for dysphagia, odynophagia, reflux symptoms, nausea, vomiting, change in bowel habits, diarrhea, constipation and abdominal pain.  Integument/breast: Negative for rash, skin lesions, and pruritus.  Hematologic/lymphatic: Negative for easy bruising, bleeding, and lymphadenopathy.  Musculoskeletal: Negative for myalgias, arthralgias, muscle weakness.  Neurological: Negative for headaches, dizziness, speech problems, gait problems and weakness.    A comprehensive 14 point review of systems was completed.  Pertinent positives and negatives noted in the the HPI.     Vital Signs:  /65 (BP Location: Left arm, Patient Position:  Sitting, Cuff Size: large)   Pulse 79   Temp 97.9 °F (36.6 °C) (Oral)   Resp 16   Ht 1.632 m (5' 4.25\")   Wt 115.1 kg (253 lb 12.8 oz)   LMP 01/21/2023 (Exact Date)   SpO2 98%   BMI 43.22 kg/m²     Physical Examination:    General: Patient is alert and oriented x 3, not in acute distress.  Psych: Friendly, cooperative with appropriate questions and responses  HEENT: EOMs intact. Oropharynx is clear.   Neck: No palpable lymphadenopathy. Neck is supple.  Lymphatics: There is no palpable lymphadenopathy throughout in the cervical, supraclavicular, axillary, or inguinal regions.  Chest: Clear to auscultation. No wheezes or rales.  Heart: Regular rate and rhythm. S1S2 normal.  Abdomen: Soft, non tender with good bowel sounds. Gravidas abdomen; appropriate for gestational age  Extremities: No edema or calf tenderness.  Neurological: Grossly intact.      Labs:    Lab Results   Component Value Date/Time    WBC 8.9 01/31/2024 03:33 PM    RBC 4.41 01/31/2024 03:33 PM    HGB 11.6 (L) 01/31/2024 03:33 PM    HCT 38.8 01/31/2024 03:33 PM    MCV 88.0 01/31/2024 03:33 PM    MCH 26.3 01/31/2024 03:33 PM    MCHC 29.9 (L) 01/31/2024 03:33 PM    RDW 14.6 01/31/2024 03:33 PM    NEPRELIM 4.94 01/31/2024 03:33 PM    .0 (H) 01/31/2024 03:33 PM       Lab Results   Component Value Date/Time     (H) 10/15/2023 04:56 AM    BUN 10 10/15/2023 04:56 AM    CREATSERUM 0.66 10/15/2023 04:56 AM    CA 9.0 10/15/2023 04:56 AM    ALB 1.8 (L) 10/11/2023 05:56 AM     10/15/2023 04:56 AM    K 3.8 10/15/2023 04:56 AM     10/15/2023 04:56 AM    CO2 27.0 10/15/2023 04:56 AM    ALKPHO 206 (H) 10/11/2023 05:56 AM     (H) 10/11/2023 05:56 AM     (H) 10/11/2023 05:56 AM         Impression:      ICD-10-CM    1. Thrombocytosis  D75.839 Ferritin     Iron And Tibc              38 year old W with PMH relevant for bilateral pulmonary emboli at at a young age due to OCP's in 2013 presents for evaluation as she is currently  pregnant and for evaluation of thrombocytosis    Plan:    1.) History of Pulmonary Embolism--2013 in the setting of OCP use    --pt was started on lovenox 40 mg/daily once her Ob/Gyn team heard of her pregnancy.   --Pt is aware she will continue lovenox during pregnancy and in the post partem setting for 6 wks s/p delivery of her baby as this anticoagulation is safe during pregnancy for mom and baby    --we reviewed that she qualifies for intermediate dosing based on her prior PE due to OCP's  --we reviewed there is sufficient data to suggest remaining at 40 mg/daily throughout the ante partem period is appropriate as the pt is not gaining a significant amount of weight    --pt returns s/p use of Lovenox (intermediate dosing) 1 mg/kg daily for 6 wks post partem. Tolerated lovenox well without any bleeding or bruising complications. Denies any DV/PE symptoms    --pt would f/u for this condition prior to another pregnancy in the future    -- I reviewed prior thrombophilia testing which revealed negative results for factor V Leiden mutation, no prothrombin gene mutation, no evidence of protein C or protein S deficiency with no evidence of Antithrombin III deficiency in 2020  -- MTHFR mutation level results are not considered to be associated with increased risks for VTE. I do not see these tests were obtained in [facility]  --so she does not have hereditary thrombophilia or high risk thrombophilia  --she only has an elevated beta 2 glycoprotein level of 30; an acquired condition, does not have triple positive antiphospholipid ab syndrome  --may see false positive results with these tests while on anticoagulation, so may need to repeat them in the future    2.) Current Use of Anticoagulation    --NO LONGER on prophylactic Lovenox    3.) Thrombocytosis    --Informed pt this is likely due to chronic iron deficiency; very common cause of anemia in pregnancy  --continue prenatal vitamin and provided a dietary source of iron; does  not need IV iron at this time  --low concern for essential thrombocytosis  --her mildly elevated plts are likely still from iron deficiency.Pt stopped taking iron replacement therapy; repeat iron studies ordered, will f/u in 3 mo for repeat eval with repeat CBC+diff & iron studies at that time      MDM: Low Risk    Brayan March MD  [facility]  [address], [place] [address]

## 2024-02-25 ENCOUNTER — PATIENT MESSAGE (OUTPATIENT)
Dept: OBGYN CLINIC | Facility: CLINIC | Age: 39
End: 2024-02-25

## 2024-02-26 NOTE — TELEPHONE ENCOUNTER
From: Cora Melendez  To: Dawson Manriquez  Sent: 2/25/2024 12:36 PM CST  Subject: Antibiotics and Breastfeeding    Hello,  I have a scheduled dentist appointment this coming Saturday, March 2nd to get my wisdom teeth taken out. I am assuming they will probably prescribe antibiotics to take after.     Is it safe to take antibiotics while breastfeeding?    Thank you,  Cora Melendez

## 2024-04-23 ENCOUNTER — LAB ENCOUNTER (OUTPATIENT)
Dept: LAB | Facility: HOSPITAL | Age: 39
End: 2024-04-23
Attending: INTERNAL MEDICINE
Payer: COMMERCIAL

## 2024-04-23 DIAGNOSIS — D75.839 THROMBOCYTOSIS: ICD-10-CM

## 2024-04-23 LAB
DEPRECATED HBV CORE AB SER IA-ACNC: 18 NG/ML
IRON SATN MFR SERPL: 9 %
IRON SERPL-MCNC: 43 UG/DL
TIBC SERPL-MCNC: 475 UG/DL (ref 250–425)
TRANSFERRIN SERPL-MCNC: 319 MG/DL (ref 250–380)

## 2024-04-23 PROCEDURE — 82728 ASSAY OF FERRITIN: CPT

## 2024-04-23 PROCEDURE — 83540 ASSAY OF IRON: CPT

## 2024-04-23 PROCEDURE — 36415 COLL VENOUS BLD VENIPUNCTURE: CPT

## 2024-04-23 PROCEDURE — 84466 ASSAY OF TRANSFERRIN: CPT

## 2024-04-30 DIAGNOSIS — D75.839 THROMBOCYTOSIS: Primary | ICD-10-CM

## 2024-05-01 ENCOUNTER — TELEPHONE (OUTPATIENT)
Dept: HEMATOLOGY/ONCOLOGY | Facility: HOSPITAL | Age: 39
End: 2024-05-01

## 2024-05-01 NOTE — TELEPHONE ENCOUNTER
[facility] TO RESCHEDULE  APPOINTMENT, DR DEJESUS IS OUT OF OFFICE, CALLED [facility] 5/1/24 GR

## 2024-05-02 ENCOUNTER — APPOINTMENT (OUTPATIENT)
Dept: HEMATOLOGY/ONCOLOGY | Facility: HOSPITAL | Age: 39
End: 2024-05-02
Attending: INTERNAL MEDICINE

## 2024-05-21 ENCOUNTER — OFFICE VISIT (OUTPATIENT)
Dept: OBGYN CLINIC | Facility: CLINIC | Age: 39
End: 2024-05-21

## 2024-05-21 VITALS
BODY MASS INDEX: 45.93 KG/M2 | WEIGHT: 256 LBS | HEIGHT: 62.5 IN | HEART RATE: 84 BPM | DIASTOLIC BLOOD PRESSURE: 74 MMHG | SYSTOLIC BLOOD PRESSURE: 125 MMHG

## 2024-05-21 DIAGNOSIS — Z01.419 WELL WOMAN EXAM: Primary | ICD-10-CM

## 2024-05-21 PROCEDURE — 3008F BODY MASS INDEX DOCD: CPT | Performed by: OBSTETRICS & GYNECOLOGY

## 2024-05-21 PROCEDURE — 3078F DIAST BP <80 MM HG: CPT | Performed by: OBSTETRICS & GYNECOLOGY

## 2024-05-21 PROCEDURE — 99395 PREV VISIT EST AGE 18-39: CPT | Performed by: OBSTETRICS & GYNECOLOGY

## 2024-05-21 PROCEDURE — 3074F SYST BP LT 130 MM HG: CPT | Performed by: OBSTETRICS & GYNECOLOGY

## 2024-05-21 NOTE — PROGRESS NOTES
Chief Complaint   Patient presents with    Gyn Exam     ANNUAL EXAM         HPI:  The patient is a 38 yo F here for WWE.  Breastfeeding 7 mo old baby.  No menses.  Mom diagnosis with breast CA 2 weeks ago; planning for surgery and genetics soon.  No gyn c/o today    No LMP recorded. (Menstrual status: Postpartum).        Latest Ref Rng & Units 2021     7:07 PM 4/3/2017    12:14 PM   RECENT PAP RESULTS   Thinprep Pap Negative for intraepithelial lesion or malignancy Negative for intraepithelial lesion or malignancy  Negative for intraepithelial lesion or malignancy    HPV Negative Negative  Negative         Menarche: 13 years old   Hx Prior Abnormal Pap: No  Pap Date: 21  Pap Result Notes: Pap neg HPV neg      Chaperone: declines      Depression Screening (PHQ-2/PHQ-9): Over the LAST 2 WEEKS   Little interest or pleasure in doing things: Not at all    Feeling down, depressed, or hopeless: Not at all    PHQ-2 SCORE: 0          Reviewed medical and surgical history below       OBSTETRICS HISTORY:  OB History    Para Term  AB Living   2 1 1 0 1 1   SAB IAB Ectopic Multiple Live Births   1 0 0 0 1       GYNE HISTORY:  History   Sexual Activity    Sexual activity: Yes    Partners: Male    Birth control/ protection: Paragard     Menarche: 13 years old       MEDICAL HISTORY:  Past Medical History:    Amaurosis fugax of right eye    Ankle swelling    Pain  Neg Doppler    Blood disorder    Lacunar infarction (HCC)    Moderate aortic regurgitation    Pulmonary embolism (HCC)    no associated DVT's believed from OCP; tx with 6 months Coumadin    Transient homonymous hemianopia       SURGICAL HISTORY:  Past Surgical History:   Procedure Laterality Date    [place] teeth removed      no associated bleeding complications       SOCIAL HISTORY:  Social History     Socioeconomic History    Marital status:      Spouse name: Not on file    Number of children: Not on file    Years of education: Not on  file    Highest education level: Not on file   Occupational History    Not on file   Tobacco Use    Smoking status: Never    Smokeless tobacco: Never   Vaping Use    Vaping status: Never Used   Substance and Sexual Activity    Alcohol use: Yes     Comment: Occasionally. Stopped when found out was pregnant    Drug use: Not Currently     Types: Cannabis     Comment: RARELY. Chew gummies up until found out was pregnant    Sexual activity: Yes     Partners: Male     Birth control/protection: Paragard   Other Topics Concern     Service Not Asked    Blood Transfusions Not Asked    Caffeine Concern Yes     Comment: coffee daily     Occupational Exposure Not Asked    Hobby Hazards Not Asked    Sleep Concern Not Asked    Stress Concern Not Asked    Weight Concern Not Asked    Special Diet Not Asked    Back Care Not Asked    Exercise Yes     Comment: 3 times per week    Bike Helmet Not Asked    Seat Belt Not Asked    Self-Exams Not Asked   Social History Narrative    Cora is  x 4 yrs. She has children. Patient is an . She lives with her  in [place], [place]. The patient does not use an assistive device.The patient does not live in a home with stairs.     Social Determinants of Health     Financial Resource Strain: Low Risk  (10/9/2023)    Financial Resource Strain     Difficulty of Paying Living Expenses: Not hard at all     Med Affordability: No   Food Insecurity: No Food Insecurity (10/9/2023)    Food Insecurity     Food Insecurity: Never true   Transportation Needs: No Transportation Needs (10/9/2023)    Transportation Needs     Lack of Transportation: No   Stress: No Stress Concern Present (10/9/2023)    Stress     Feeling of Stress : No   Housing Stability: Low Risk  (10/9/2023)    Housing Stability     Housing Instability: No     Housing Instability Emergency: Not on file     Crib or Bassinette: Not on file       FAMILY HISTORY:  Family History   Problem Relation Age of Onset     Hypertension Mother         Controlled    Breast Cancer Mother     Bleeding Disorders Neg     DVT/VTE Neg     Cancer Neg        MEDICATIONS:    Current Outpatient Medications:     Misc. Devices (BREAST PUMP) Does not apply Misc, Double electric breast pump e0603, Disp: 1 each, Rfl: 1    ALLERGIES:  No Known Allergies      Review of Systems:  Constitutional:  Denies fevers and chills   Cardiovascular:  denies chest pain or palpitations  Respiratory:  denies shortness of breath  Gastrointestinal:  denies heartburn, abdominal pain, diarrhea or constipation  Genitourinary:  denies dysuria, incontinence, abnormal vaginal discharge, vaginal itching  Musculoskeletal:  denies back pain.  Skin/Breast:  Denies any breast pain, lumps, or discharge.   Neurological:  denies headaches, extremity weakness  Psychiatric: denies depression or anxiety.      Vitals:    05/21/24 1511   BP: 125/74   Pulse: 84       PHYSICAL EXAM:   Constitutional: well developed, well nourished  Head/Face: normocephalic  Neck/Thyroid: thyroid symmetric, no thyromegaly, no nodules, no adenopathy  Heart: Regular rate and rhythm   Lungs: clear to ascultation bilaterally   Lymphatic:no abnormal supraclavicular or axillary adenopathy is noted  Breast: normal without palpable masses, tenderness, asymmetry, nipple discharge, nipple retraction or skin changes  Abdomen:  soft, nontender, nondistended, no masses  Skin/Hair: no unusual rashes or bruises  Extremities: no edema, no cyanosis  Psychiatric: Appropriate mood and affect    Pelvic Exam:  External Genitalia: normal appearance, hair distribution, and no lesions  Urethral Meatus:  normal in size, location, without lesions and prolapse  Bladder:  No fullness, masses or tenderness  Vagina:  Normal appearance without lesions, no abnormal discharge  Cervix:  Normal without tenderness on motion  Uterus: normal in size, contour, position, mobility, without tenderness  Adnexa: normal without masses or  tenderness  Perineum: normal    Assessment/Plan:  Cora was seen today for gyn exam.    Diagnoses and all orders for this visit:    Well woman exam        WWE:   Reviewed ASCCP guidelines with the patient -- Pap UTD  Contraception: condoms  Breast Health:     Mammo @ 42 yo  Encouraged monthly self breast exams with the patient   Patient will send me  msg with genetic resutls for mom; if of concern, plan for HRBC and genetic testing  Discussed diet, exercise, MVIs with Vit D  Follow up in 1 yr for WWE

## 2024-06-06 ENCOUNTER — NURSE TRIAGE (OUTPATIENT)
Dept: FAMILY MEDICINE CLINIC | Facility: CLINIC | Age: 39
End: 2024-06-06

## 2024-06-06 NOTE — TELEPHONE ENCOUNTER
Action Requested: Summary for Provider     []  Critical Lab, Recommendations Needed  [] Need Additional Advice  []   FYI    []   Need Orders  [] Need Medications Sent to Pharmacy  []  Other     SUMMARY: Disposition per protocol, office visit scheduled;  Future Appointments   Date Time Provider Department Center   6/7/2024  8:30 AM Sola Toledo APRN [facility]       Reason for call: Rash  Onset: Data Unavailable  Patient calling, verified name and date of birth  Rash on chest more on right than left. Breastfeeding, period started 2 days ago.  Red bumps, itching, red, warm tender, not blisters. No fever. Cortisone stopped itching temporarily.  Care Advice    Patient/Caregiver understands and will follow care advice?: Yes, plans to follow advice           Rash or Redness - [id number]  Manda Bender RN Thu Jun 06, 2024 12:28 PM      Disposition and First Aid       SEE IN OFFICE WITHIN 3 DAYS:   * You need to be examined.   * Let me give you an appointment.              General Care Advice for Mild Localized Rash       WASH THE AREA:  * Wash the area once thoroughly with soap and water to remove any remaining irritants. Thereafter avoid soaps in this area.  * Cleanse the area if needed with warm water.    COLD PACK FOR MILD ITCHING OR MILD PAIN:  * Apply ice or soak in cold water for 20 minutes every 3 or 4 hours.  * Do this to reduce itching or pain.    HYDROCORTISONE CREAM FOR ITCHING:  * You can use hydrocortisone for very itchy spots.  * Put 1% hydrocortisone cream on the itchy area(s) 3 times a day. Use it for a couple days, until it feels better. This will help decrease the itching.  * This is an over-the-counter (OTC) drug. You can buy it at the drugstore.  * Some people like to keep the cream in the refrigerator. It feels even better if the cream is used when it is cold.  * CAUTION: Do not use hydrocortisone cream for more than 1 week without talking to your doctor.  * CAUTION: Do not use if the cause  is ringworm, impetigo, Jock Itch, or Athlete's Foot.  * Read the instructions and warnings on the package insert for all medicines you take.    DON'T SCRATCH:  * Try not to scratch.  * Scratching makes the itching worse (the 'Itch-Scratch' cycle).  * Cut your fingernails short. Wash hands frequently with an antibacterial soap. This will help prevent a bacterial skin infection.    CALL BACK IF:  * Rash spreads or becomes worse  * Rash lasts longer than 1 week  * You become worse                           Patient verbalizes understanding and agrees to plan of care.      Reason for Disposition   Localized rash present > 7 days    Protocols used: Rash or Redness - [id number]-ID

## 2024-06-07 ENCOUNTER — OFFICE VISIT (OUTPATIENT)
Dept: INTERNAL MEDICINE CLINIC | Facility: CLINIC | Age: 39
End: 2024-06-07
Payer: COMMERCIAL

## 2024-06-07 VITALS
HEART RATE: 77 BPM | BODY MASS INDEX: 47.01 KG/M2 | DIASTOLIC BLOOD PRESSURE: 84 MMHG | OXYGEN SATURATION: 97 % | WEIGHT: 262 LBS | HEIGHT: 62.5 IN | SYSTOLIC BLOOD PRESSURE: 122 MMHG

## 2024-06-07 DIAGNOSIS — B36.9 FUNGAL RASH OF TRUNK: Primary | ICD-10-CM

## 2024-06-07 PROCEDURE — 3079F DIAST BP 80-89 MM HG: CPT

## 2024-06-07 PROCEDURE — 3074F SYST BP LT 130 MM HG: CPT

## 2024-06-07 PROCEDURE — 99213 OFFICE O/P EST LOW 20 MIN: CPT

## 2024-06-07 PROCEDURE — 3008F BODY MASS INDEX DOCD: CPT

## 2024-06-07 RX ORDER — KETOCONAZOLE 20 MG/G
1 CREAM TOPICAL 2 TIMES DAILY
Qty: 30 G | Refills: 0 | Status: SHIPPED | OUTPATIENT
Start: 2024-06-07

## 2024-06-07 NOTE — PROGRESS NOTES
Subjective:   Cora Melendez is a 39 year old female who presents for Rash     C/c rash starting about 2 weeks ago on heft left breast and a little on the right   Started very light and thought it was just dry skin but worsening in the past week and tried cortisone OTC a few days ago   It is itchy but not painful   There is no nipple involvement  Just got her first period after having a baby and wondered if that caused it  No fever, chills, body aches    History/Other:    Chief Complaint Reviewed and Verified  No Further Nursing Notes to   Review  Tobacco Reviewed  Allergies Reviewed  Medications Reviewed  OB   Status Reviewed         Tobacco:  She has never smoked tobacco.    Current Outpatient Medications   Medication Sig Dispense Refill    ketoconazole 2 % External Cream Apply 1 Application topically 2 (two) times daily. 30 g 0    Misc. Devices (BREAST PUMP) Does not apply Misc Double electric breast pump e0603 1 each 1       Review of Systems:  Review of Systems   Constitutional: Negative.    Respiratory: Negative.     Cardiovascular: Negative.    Gastrointestinal: Negative.    Skin:  Positive for rash.   Neurological: Negative.      Objective:   /84   Pulse 77   Ht 5' 2.5\" (1.588 m)   Wt 262 lb (118.8 kg)   LMP 06/04/2024   SpO2 97%   BMI 47.16 kg/m²  Estimated body mass index is 47.16 kg/m² as calculated from the following:    Height as of this encounter: 5' 2.5\" (1.588 m).    Weight as of this encounter: 262 lb (118.8 kg).  Physical Exam  Vitals reviewed.   Constitutional:       General: She is not in acute distress.     Appearance: Normal appearance. She is well-developed.   Cardiovascular:      Rate and Rhythm: Normal rate.   Pulmonary:      Effort: Pulmonary effort is normal.   Skin:     General: Skin is warm and dry.      Comments: Macular rash to bilateral breast    Neurological:      Mental Status: She is alert and oriented to person, place, and time.       Assessment & Plan:   1.  Fungal rash of trunk (Primary)  -     Ketoconazole; Apply 1 Application topically 2 (two) times daily.  Dispense: 30 g; Refill: 0  Notify the office if no improvement or if spreading to involve the nipple    MACRINA Ramirez, 6/7/2024, 8:43 AM

## 2024-06-13 ENCOUNTER — APPOINTMENT (OUTPATIENT)
Dept: HEMATOLOGY/ONCOLOGY | Facility: HOSPITAL | Age: 39
End: 2024-06-13
Attending: INTERNAL MEDICINE
Payer: COMMERCIAL

## 2024-06-27 ENCOUNTER — OFFICE VISIT (OUTPATIENT)
Dept: HEMATOLOGY/ONCOLOGY | Facility: HOSPITAL | Age: 39
End: 2024-06-27
Attending: INTERNAL MEDICINE
Payer: COMMERCIAL

## 2024-06-27 VITALS
RESPIRATION RATE: 16 BRPM | TEMPERATURE: 97 F | HEART RATE: 71 BPM | DIASTOLIC BLOOD PRESSURE: 85 MMHG | HEIGHT: 62.5 IN | SYSTOLIC BLOOD PRESSURE: 128 MMHG | OXYGEN SATURATION: 98 % | WEIGHT: 263.19 LBS | BODY MASS INDEX: 47.22 KG/M2

## 2024-06-27 DIAGNOSIS — Z86.711 HISTORY OF PULMONARY EMBOLISM: ICD-10-CM

## 2024-06-27 DIAGNOSIS — D75.839 THROMBOCYTOSIS: ICD-10-CM

## 2024-06-27 DIAGNOSIS — Z86.2 HISTORY OF THROMBOCYTOSIS: Primary | ICD-10-CM

## 2024-06-27 LAB
BASOPHILS # BLD AUTO: 0.05 X10(3) UL (ref 0–0.2)
BASOPHILS NFR BLD AUTO: 0.6 %
DEPRECATED HBV CORE AB SER IA-ACNC: 17.8 NG/ML
DEPRECATED RDW RBC AUTO: 47.2 FL (ref 35.1–46.3)
EOSINOPHIL # BLD AUTO: 0.11 X10(3) UL (ref 0–0.7)
EOSINOPHIL NFR BLD AUTO: 1.3 %
ERYTHROCYTE [DISTWIDTH] IN BLOOD BY AUTOMATED COUNT: 14.6 % (ref 11–15)
HCT VFR BLD AUTO: 38.9 %
HGB BLD-MCNC: 12 G/DL
IMM GRANULOCYTES # BLD AUTO: 0.03 X10(3) UL (ref 0–1)
IMM GRANULOCYTES NFR BLD: 0.4 %
IRON SATN MFR SERPL: 8 %
IRON SERPL-MCNC: 37 UG/DL
LYMPHOCYTES # BLD AUTO: 2.25 X10(3) UL (ref 1–4)
LYMPHOCYTES NFR BLD AUTO: 27.6 %
MCH RBC QN AUTO: 26.9 PG (ref 26–34)
MCHC RBC AUTO-ENTMCNC: 30.8 G/DL (ref 31–37)
MCV RBC AUTO: 87.2 FL
MONOCYTES # BLD AUTO: 0.53 X10(3) UL (ref 0.1–1)
MONOCYTES NFR BLD AUTO: 6.5 %
NEUTROPHILS # BLD AUTO: 5.19 X10 (3) UL (ref 1.5–7.7)
NEUTROPHILS # BLD AUTO: 5.19 X10(3) UL (ref 1.5–7.7)
NEUTROPHILS NFR BLD AUTO: 63.6 %
PLATELET # BLD AUTO: 439 10(3)UL (ref 150–450)
RBC # BLD AUTO: 4.46 X10(6)UL
TIBC SERPL-MCNC: 447 UG/DL (ref 250–425)
TRANSFERRIN SERPL-MCNC: 300 MG/DL (ref 250–380)
WBC # BLD AUTO: 8.2 X10(3) UL (ref 4–11)

## 2024-06-27 PROCEDURE — 36415 COLL VENOUS BLD VENIPUNCTURE: CPT

## 2024-06-27 PROCEDURE — 99214 OFFICE O/P EST MOD 30 MIN: CPT | Performed by: INTERNAL MEDICINE

## 2024-06-27 PROCEDURE — 84466 ASSAY OF TRANSFERRIN: CPT

## 2024-06-27 PROCEDURE — 82728 ASSAY OF FERRITIN: CPT

## 2024-06-27 PROCEDURE — 83540 ASSAY OF IRON: CPT

## 2024-06-27 PROCEDURE — 85025 COMPLETE CBC W/AUTO DIFF WBC: CPT

## 2024-06-27 NOTE — PROGRESS NOTES
Hematology Progress Note    Patient Name: Cora Melendez   YOB: 1985   Medical Record Number: [id number]   CSN: [id number]  Consulting Physician: Brayan March MD  Referring Physician(s): Mayito Fuller DO  Date of Visit: 2024    Chief Complaint:  History of Pulmonary Embolism, Elevated Platelets    History of Present Illness:     Cora Melendez is a 39 year old female that was seen today in the hematology suite for evaluation of the above as the patient is currently pregnant. She has PMH relevant for bilateral pulmonary emboli at at a young age due to OCP's in . Patient is a  currently at 10 weeks and 4 days gestation on lovenox 40 mg/daily without any bleeding complications and minor bruising at the site of injection. Cora reports the pregnancy has been going well without any complications. She denies any recurrence of PE symptoms or DVT symptoms. Patient has some mild thrombocytosis noted but denies reports of blood loss from any orifice.  She reports adherence to her prenatal vitamin.  Patient is without any systemic signs of illness.  Her review of systems is negative for any other concerns.    Interval History:  Patient presents for planned follow up of thrombocytosis. Pt underwent planned post partem lovenox prophylaxis s/p delivery of her healthy baby boy on 10/10/2023. She denies any bruising issues. Pt mentions return of her menstrual cycle recently as her only source of bleeding. Cora has no new concerns on ROS.    Past Medical History:  Past Medical History:    Amaurosis fugax of right eye    Ankle swelling    Pain  Neg Doppler    Blood disorder    Family history of breast cancer    Lacunar infarction (HCC)    Moderate aortic regurgitation    Pulmonary embolism (HCC)    no associated DVT's believed from OCP; tx with 6 months Coumadin    Transient homonymous hemianopia       Past Surgical History:  Past Surgical History:   Procedure Laterality Date    [place] teeth  removed  2005    no associated bleeding complications       Family Medical History:  Family History   Problem Relation Age of Onset    Hypertension Mother         Controlled    Breast Cancer Mother     Bleeding Disorders Neg     DVT/VTE Neg     Cancer Neg        Social History:  Social History     Socioeconomic History    Marital status:      Spouse name: Not on file    Number of children: Not on file    Years of education: Not on file    Highest education level: Not on file   Occupational History    Not on file   Tobacco Use    Smoking status: Never    Smokeless tobacco: Never   Vaping Use    Vaping status: Never Used   Substance and Sexual Activity    Alcohol use: Yes     Comment: Occasionally. Stopped when found out was pregnant    Drug use: Not Currently     Types: Cannabis     Comment: RARELY. Chew gummies up until found out was pregnant    Sexual activity: Yes     Partners: Male     Birth control/protection: Paragard   Other Topics Concern     Service Not Asked    Blood Transfusions Not Asked    Caffeine Concern Yes     Comment: coffee daily     Occupational Exposure Not Asked    Hobby Hazards Not Asked    Sleep Concern Not Asked    Stress Concern Not Asked    Weight Concern Not Asked    Special Diet Not Asked    Back Care Not Asked    Exercise Yes     Comment: 3 times per week    Bike Helmet Not Asked    Seat Belt Not Asked    Self-Exams Not Asked   Social History Narrative    Cora is  x 4 yrs. She has children. Patient is an . She lives with her  in [place], [place]. The patient does not use an assistive device.The patient does not live in a home with stairs.     Social Determinants of Health     Financial Resource Strain: Low Risk  (10/9/2023)    Financial Resource Strain     Difficulty of Paying Living Expenses: Not hard at all     Med Affordability: No   Food Insecurity: No Food Insecurity (10/9/2023)    Food Insecurity     Food Insecurity: Never true    Transportation Needs: No Transportation Needs (10/9/2023)    Transportation Needs     Lack of Transportation: No   Stress: No Stress Concern Present (10/9/2023)    Stress     Feeling of Stress : No   Housing Stability: Low Risk  (10/9/2023)    Housing Stability     Housing Instability: No     Housing Instability Emergency: Not on file     Crib or Bassinette: Not on file       Allergies:   No Known Allergies    Current Medications:  No outpatient medications have been marked as taking for the 6/27/24 encounter (Office Visit) with Brayan March MD.       Review of Systems:    Constitutional: Negative for anorexia, chills, fatigue, fevers, night sweats and weight loss.  Eyes: Negative for visual disturbance, irritation and redness.  Respiratory: Negative for cough, hemoptysis, chest pain, or dyspnea on exertion.  Gastrointestinal: Negative for dysphagia, odynophagia, reflux symptoms, nausea, vomiting, change in bowel habits, diarrhea, constipation and abdominal pain.  Integument/breast: Negative for rash, skin lesions, and pruritus.  Hematologic/lymphatic: Negative for easy bruising, bleeding, and lymphadenopathy.  Musculoskeletal: Negative for myalgias, arthralgias, muscle weakness.  Neurological: Negative for headaches, dizziness, speech problems, gait problems and weakness.    A comprehensive 14 point review of systems was completed.  Pertinent positives and negatives noted in the the HPI.     Vital Signs:  /85 (BP Location: Left arm, Patient Position: Sitting, Cuff Size: adult)   Pulse 71   Temp 97.4 °F (36.3 °C) (Oral)   Resp 16   Ht 1.588 m (5' 2.5\")   Wt 119.4 kg (263 lb 3.2 oz)   LMP 06/04/2024   SpO2 98%   BMI 47.37 kg/m²     Physical Examination:    General: Patient is alert and oriented x 3, not in acute distress.  Psych: Friendly, cooperative with appropriate questions and responses  HEENT: EOMs intact. Oropharynx is clear.   Neck: No palpable lymphadenopathy. Neck is supple.  Lymphatics:  There is no palpable lymphadenopathy throughout in the cervical, supraclavicular, axillary, or inguinal regions.  Chest: Clear to auscultation. No wheezes or rales.  Heart: Regular rate and rhythm. S1S2 normal.  Abdomen: Soft, non tender with good bowel sounds. Gravidas abdomen; appropriate for gestational age  Extremities: No edema or calf tenderness.  Neurological: Grossly intact.      Labs:    Lab Results   Component Value Date/Time    WBC 8.2 06/27/2024 02:49 PM    RBC 4.46 06/27/2024 02:49 PM    HGB 12.0 06/27/2024 02:49 PM    HCT 38.9 06/27/2024 02:49 PM    MCV 87.2 06/27/2024 02:49 PM    MCH 26.9 06/27/2024 02:49 PM    MCHC 30.8 (L) 06/27/2024 02:49 PM    RDW 14.6 06/27/2024 02:49 PM    NEPRELIM 5.19 06/27/2024 02:49 PM    .0 06/27/2024 02:49 PM       Lab Results   Component Value Date/Time     (H) 10/15/2023 04:56 AM    BUN 10 10/15/2023 04:56 AM    CREATSERUM 0.66 10/15/2023 04:56 AM    CA 9.0 10/15/2023 04:56 AM    ALB 1.8 (L) 10/11/2023 05:56 AM     10/15/2023 04:56 AM    K 3.8 10/15/2023 04:56 AM     10/15/2023 04:56 AM    CO2 27.0 10/15/2023 04:56 AM    ALKPHO 206 (H) 10/11/2023 05:56 AM     (H) 10/11/2023 05:56 AM     (H) 10/11/2023 05:56 AM         Impression:    No diagnosis found.          39 year old W with PMH relevant for bilateral pulmonary emboli at at a young age due to OCP's in 2013 presents for evaluation as she is currently pregnant and for evaluation of thrombocytosis    Plan:    1.) History of Pulmonary Embolism--2013 in the setting of OCP use    --pt was started on lovenox 40 mg/daily once her Ob/Gyn team heard of her pregnancy.   --Pt is aware she will continue lovenox during pregnancy and in the post partem setting for 6 wks s/p delivery of her baby as this anticoagulation is safe during pregnancy for mom and baby    --we reviewed that she qualifies for intermediate dosing based on her prior PE due to OCP's  --we reviewed there is sufficient data  to suggest remaining at 40 mg/daily throughout the ante partem period is appropriate as the pt is not gaining a significant amount of weight    --pt returned in 2/24 s/p use of Lovenox (intermediate dosing) 1 mg/kg daily for 6 wks post partem. Tolerated lovenox well without any bleeding or bruising complications. Denies any DV/PE symptoms    --pt would f/u for this condition prior to another pregnancy in the future    -- I reviewed prior thrombophilia testing which revealed negative results for factor V Leiden mutation, no prothrombin gene mutation, no evidence of protein C or protein S deficiency with no evidence of Antithrombin III deficiency in 2020  -- MTHFR mutation level results are not considered to be associated with increased risks for VTE. I do not see these tests were obtained in [facility]  --so she does not have hereditary thrombophilia or high risk thrombophilia  --she only has an elevated beta 2 glycoprotein level of 30; an acquired condition, does not have triple positive antiphospholipid ab syndrome  --may see false positive results with these tests while on anticoagulation, so may need to repeat them in the future      2.) Thrombocytosis    --Informed pt this is likely due to chronic iron deficiency; very common cause of anemia in pregnancy  --continue prenatal vitamin and provided a dietary source of iron; does not need IV iron at this time  --low concern for essential thrombocytosis  --her mildly elevated plts in 2/24 were still from iron deficiency  --Pt stopped taking iron replacement therapy; repeat iron studies ordered and pt presents today for planned 3 mo f/u  --labs in April show no further signs of INEZ and today her plts are normal and no signs of anemia  --RTC in the future as needed per above    MDM: Moderate Risk    Brayan March MD  [facility]  [place] [address]. [facility], [place] [address]

## 2024-12-17 ENCOUNTER — OFFICE VISIT (OUTPATIENT)
Dept: FAMILY MEDICINE CLINIC | Facility: CLINIC | Age: 39
End: 2024-12-17
Payer: COMMERCIAL

## 2024-12-17 VITALS
WEIGHT: 263 LBS | BODY MASS INDEX: 47.19 KG/M2 | RESPIRATION RATE: 17 BRPM | SYSTOLIC BLOOD PRESSURE: 138 MMHG | HEIGHT: 62.5 IN | HEART RATE: 74 BPM | DIASTOLIC BLOOD PRESSURE: 85 MMHG

## 2024-12-17 DIAGNOSIS — L98.8 SKIN LESION OF BREAST: Primary | ICD-10-CM

## 2024-12-17 PROCEDURE — 99212 OFFICE O/P EST SF 10 MIN: CPT | Performed by: FAMILY MEDICINE

## 2024-12-17 PROCEDURE — 3008F BODY MASS INDEX DOCD: CPT | Performed by: FAMILY MEDICINE

## 2024-12-17 PROCEDURE — 3079F DIAST BP 80-89 MM HG: CPT | Performed by: FAMILY MEDICINE

## 2024-12-17 PROCEDURE — 3075F SYST BP GE 130 - 139MM HG: CPT | Performed by: FAMILY MEDICINE

## 2024-12-17 NOTE — PROGRESS NOTES
Blood pressure 138/85, pulse 74, resp. rate 17, height 5' 2.5\" (1.588 m), weight 263 lb (119.3 kg), last menstrual period 06/04/2024, currently breastfeeding.      Complaining of enlarging skin lesion on the areola of the right breast.  It has gotten bigger after breast-feeding    Objective large skin tag noted areola right breast ( LISSET CRISOSTOMO )    Assessment large skin tag lesion noted    Plan follow-up with Dr. Marino for removal

## 2025-01-23 ENCOUNTER — NURSE ONLY (OUTPATIENT)
Dept: SURGERY | Facility: CLINIC | Age: 40
End: 2025-01-23
Payer: COMMERCIAL

## 2025-01-23 VITALS
DIASTOLIC BLOOD PRESSURE: 90 MMHG | BODY MASS INDEX: 47.19 KG/M2 | SYSTOLIC BLOOD PRESSURE: 146 MMHG | WEIGHT: 263 LBS | HEIGHT: 62.5 IN | HEART RATE: 72 BPM

## 2025-01-23 DIAGNOSIS — L91.8 SKIN TAG: Primary | ICD-10-CM

## 2025-01-23 PROCEDURE — 3008F BODY MASS INDEX DOCD: CPT

## 2025-01-23 PROCEDURE — 3077F SYST BP >= 140 MM HG: CPT

## 2025-01-23 PROCEDURE — 3080F DIAST BP >= 90 MM HG: CPT

## 2025-01-23 PROCEDURE — 99203 OFFICE O/P NEW LOW 30 MIN: CPT

## 2025-01-23 NOTE — H&P
New Patient Visit Note       Active Problems      1. Skin tag        Chief Complaint   Chief Complaint   Patient presents with    Lesion     Referred by Dr. Mayito Fuller for lesion, R BR, present since patient's pregnancy, and enlarged during nursing.        History of Present Illness   Cora is a pleasant 39 year old patient presenting for consultation of a skin tag on the right areola. She was referred by Dr. Fuller. She reports it has been present for many years, denies experiencing any breast pain, discharge, skin changes.  It has grown over the years.  Her mother was recently diagnosed with \"nongenetic\" type of breast cancer.  Her mother also has a history of skin cancer.  She is not currently a tobacco user.  She denies ever having the HPV shot or known history of HPV infection.  She has never gotten a mammogram before.  She has a history of PE during pregnancy, no longer taking anticoagulation.      Allergies  Cora has No Known Allergies.    Past Medical / Surgical / Social / Family History    The past medical and past surgical history have been reviewed by me today.    Past Medical History:    Amaurosis fugax of right eye    Ankle swelling    Pain  Neg Doppler    Blood disorder    Family history of breast cancer    Lacunar infarction (HCC)    Moderate aortic regurgitation    Pulmonary embolism (HCC)    no associated DVT's believed from OCP; tx with 6 months Coumadin    Transient homonymous hemianopia     Past Surgical History:   Procedure Laterality Date    [place] teeth removed  2005    no associated bleeding complications       The family history and social history have been reviewed by me today.    Family History   Problem Relation Age of Onset    Hypertension Mother         Controlled    Breast Cancer Mother     Bleeding Disorders Neg     DVT/VTE Neg     Cancer Neg      Social History     Socioeconomic History    Marital status:    Tobacco Use    Smoking status: Never    Smokeless tobacco:  Never   Vaping Use    Vaping status: Never Used   Substance and Sexual Activity    Alcohol use: Yes     Comment: Occasionally. Stopped when found out was pregnant    Drug use: Not Currently     Types: Cannabis     Comment: RARELY. Chew gummies up until found out was pregnant    Sexual activity: Yes     Partners: Male     Birth control/protection: Paragard   Other Topics Concern    Caffeine Concern Yes     Comment: coffee daily     Exercise Yes     Comment: 3 times per week      No current outpatient medications on file.      Review of Systems  The Review of Systems has been reviewed by me during today.  Review of Systems   Constitutional:  Negative for chills, fatigue and fever.   Respiratory:  Negative for shortness of breath.    Cardiovascular:  Negative for chest pain and leg swelling.   Gastrointestinal:  Negative for abdominal pain, constipation, diarrhea, nausea and vomiting.   Skin:         Right nipple skin tag       Physical Findings   /90   Pulse 72   Ht 5' 2.5\" (1.588 m)   Wt 263 lb (119.3 kg)   LMP 06/04/2024   BMI 47.34 kg/m²   Physical Exam  Constitutional:       General: She is not in acute distress.     Appearance: Normal appearance. She is not ill-appearing.   HENT:      Head: Normocephalic and atraumatic.      Mouth/Throat:      Mouth: Mucous membranes are moist.      Pharynx: Oropharynx is clear.   Eyes:      Extraocular Movements: Extraocular movements intact.      Conjunctiva/sclera: Conjunctivae normal.      Pupils: Pupils are equal, round, and reactive to light.   Cardiovascular:      Rate and Rhythm: Normal rate and regular rhythm.      Pulses: Normal pulses.      Heart sounds: Normal heart sounds.   Pulmonary:      Effort: Pulmonary effort is normal.      Breath sounds: Normal breath sounds.   Abdominal:      General: Abdomen is flat. Bowel sounds are normal.      Palpations: Abdomen is soft.   Skin:     Comments: 9oclock position on right areola, filiform skin tag present, flesh  colored. Size of a grain of rice.    Neurological:      Mental Status: She is alert.             Assessment   1. Skin tag          Plan   Plan for in office removal of right areola skin tag. The procedure, risks, benefits, and recovery were discussed. All patient questions answered. The patient expresses understanding and is agreeable.       No orders of the defined types were placed in this encounter.      Imaging & Referrals   None    Follow Up  No follow-ups on file.    [facility] PA

## 2025-02-13 ENCOUNTER — NURSE ONLY (OUTPATIENT)
Dept: SURGERY | Facility: CLINIC | Age: 40
End: 2025-02-13
Payer: COMMERCIAL

## 2025-02-13 VITALS
DIASTOLIC BLOOD PRESSURE: 80 MMHG | SYSTOLIC BLOOD PRESSURE: 137 MMHG | BODY MASS INDEX: 47 KG/M2 | HEART RATE: 74 BPM | WEIGHT: 263 LBS

## 2025-02-13 DIAGNOSIS — L91.8 SKIN TAG: Primary | ICD-10-CM

## 2025-02-13 PROCEDURE — 11200 RMVL SKIN TAGS UP TO&INC 15: CPT

## 2025-02-13 PROCEDURE — 3079F DIAST BP 80-89 MM HG: CPT

## 2025-02-13 PROCEDURE — 3075F SYST BP GE 130 - 139MM HG: CPT

## 2025-02-13 NOTE — PROCEDURES
Procedure Note  The risks, benefits, alternatives and expected recovery were explained.  The pt expressed understanding and wished to proceed with the procedure.      Preop:  8 mm skin tag of the right areola  Postop:  Same  Procedure: Excision 8 mm skin tag of the right areola  Anesthesia:  Local, 1% lidocaine with epi, 2 cc  EBL:  Minimal  Description:  The skin was prepped and draped in sterile fashion.  The skin and subcutaneous tissue surrounding the skin tag was infiltrated with local anesthetic.  The skin tag was grasped and excised with a #15 scalpel. The wound was cleaned and dressed with gauze. The patient tolerated the procedure well and was given wound care instructions.     The skin tag was sent for routine pathology.   Luigi Contreras PA-C

## 2025-05-21 ENCOUNTER — OFFICE VISIT (OUTPATIENT)
Dept: OBGYN CLINIC | Facility: CLINIC | Age: 40
End: 2025-05-21
Payer: COMMERCIAL

## 2025-05-21 VITALS
HEIGHT: 62.5 IN | SYSTOLIC BLOOD PRESSURE: 118 MMHG | BODY MASS INDEX: 47.01 KG/M2 | HEART RATE: 96 BPM | WEIGHT: 262 LBS | DIASTOLIC BLOOD PRESSURE: 81 MMHG

## 2025-05-21 DIAGNOSIS — Z12.31 SCREENING MAMMOGRAM, ENCOUNTER FOR: ICD-10-CM

## 2025-05-21 DIAGNOSIS — Z01.419 WELL WOMAN EXAM: Primary | ICD-10-CM

## 2025-05-21 DIAGNOSIS — Z12.4 CERVICAL CANCER SCREENING: ICD-10-CM

## 2025-05-21 PROCEDURE — 99395 PREV VISIT EST AGE 18-39: CPT | Performed by: OBSTETRICS & GYNECOLOGY

## 2025-05-21 PROCEDURE — 3079F DIAST BP 80-89 MM HG: CPT | Performed by: OBSTETRICS & GYNECOLOGY

## 2025-05-21 PROCEDURE — 3008F BODY MASS INDEX DOCD: CPT | Performed by: OBSTETRICS & GYNECOLOGY

## 2025-05-21 PROCEDURE — 3074F SYST BP LT 130 MM HG: CPT | Performed by: OBSTETRICS & GYNECOLOGY

## 2025-05-21 NOTE — PROGRESS NOTES
The following individual(s) verbally consented to be recorded using ambient AI listening technology and understand that they can each withdraw their consent to this listening technology at any point by asking the clinician to turn off or pause the recording:    Patient name: Cora Tanrahul

## 2025-05-21 NOTE — PROGRESS NOTES
Chief Complaint   Patient presents with    Gyn Exam     annual         HPI:      History of Present Illness  Cora Melendez is a 40 year old female who presents for a routine gynecological exam and discussion about family planning.    She is sexually active and uses condoms for contraception. Her menstrual cycles are regular, occurring monthly. She is considering having another child and is aware of potential fertility and genetic risks associated with advanced maternal age. Her previous pregnancy resulted in an uncomplicated vaginal delivery despite a history of VTE from OCs. Her family history includes her mother's negative genetic testing for breast CA and a recent clear scan. She performs monthly self-breast exams and has not noticed any abnormalities.      Patient's last menstrual period was 05/15/2025 (exact date).        Latest Ref Rng & Units 2021     7:07 PM 4/3/2017    12:14 PM   RECENT PAP RESULTS   INTERPRETATION/RESULT: Negative for intraepithelial lesion or malignancy Negative for intraepithelial lesion or malignancy  Negative for intraepithelial lesion or malignancy    HPV Negative Negative  Negative         Menarche: 13 years old   Hx Prior Abnormal Pap: No  Pap Date: 21  Pap Result Notes: Pap neg HPV neg      Chaperone: declines      Depression Screening (PHQ-2/PHQ-9): Over the LAST 2 WEEKS                        Reviewed medical and surgical history below       OBSTETRICS HISTORY:  OB History    Para Term  AB Living   2 1 1 0 1 1   SAB IAB Ectopic Multiple Live Births   1 0 0 0 1       GYNE HISTORY:  History   Sexual Activity    Sexual activity: Yes    Partners: Male     Menarche: 13 years old       MEDICAL HISTORY:  Past Medical History:    Amaurosis fugax of right eye    Ankle swelling    Pain  Neg Doppler    Blood disorder    Family history of breast cancer    Lacunar infarction (HCC)    Moderate aortic regurgitation    Pulmonary embolism (HCC)    no associated DVT's  believed from OCP; tx with 6 months Coumadin    Transient homonymous hemianopia       SURGICAL HISTORY:  Past Surgical History:   Procedure Laterality Date    [place] teeth removed  2005    no associated bleeding complications       SOCIAL HISTORY:  Social History     Socioeconomic History    Marital status:      Spouse name: Not on file    Number of children: Not on file    Years of education: Not on file    Highest education level: Not on file   Occupational History    Not on file   Tobacco Use    Smoking status: Never    Smokeless tobacco: Never   Vaping Use    Vaping status: Never Used   Substance and Sexual Activity    Alcohol use: Yes     Comment: Occasionally. Stopped when found out was pregnant    Drug use: Not Currently     Types: Cannabis     Comment: RARELY. Chew gummies up until found out was pregnant    Sexual activity: Yes     Partners: Male   Other Topics Concern     Service Not Asked    Blood Transfusions Not Asked    Caffeine Concern Yes     Comment: coffee daily     Occupational Exposure Not Asked    Hobby Hazards Not Asked    Sleep Concern Not Asked    Stress Concern Not Asked    Weight Concern Not Asked    Special Diet Not Asked    Back Care Not Asked    Exercise Yes     Comment: 3 times per week    Bike Helmet Not Asked    Seat Belt Not Asked    Self-Exams Not Asked   Social History Narrative    Cora is  x 4 yrs. She has children. Patient is an . She lives with her  in [place], [place]. The patient does not use an assistive device.The patient does not live in a home with stairs.     Social Drivers of Health     Food Insecurity: No Food Insecurity (10/9/2023)    Food Insecurity     Food Insecurity: Never true   Transportation Needs: No Transportation Needs (10/9/2023)    Transportation Needs     Lack of Transportation: No   Stress: No Stress Concern Present (10/9/2023)    Stress     Feeling of Stress : No   Housing Stability: Low Risk  (10/9/2023)     Housing Stability     Housing Instability: No     Housing Instability Emergency: Not on file     Crib or Bassinette: Not on file       FAMILY HISTORY:  Family History   Problem Relation Age of Onset    Hypertension Mother         Controlled    Breast Cancer Mother         genetics negative    Bleeding Disorders Neg     DVT/VTE Neg     Cancer Neg        MEDICATIONS:  No current outpatient medications on file.    ALLERGIES:  No Known Allergies      Review of Systems:  Constitutional:  Denies fevers and chills   Cardiovascular:  denies chest pain or palpitations  Respiratory:  denies shortness of breath  Gastrointestinal:  denies heartburn, abdominal pain, diarrhea or constipation  Genitourinary:  denies dysuria, incontinence, abnormal vaginal discharge, vaginal itching  Musculoskeletal:  denies back pain.  Skin/Breast:  Denies any breast pain, lumps, or discharge.   Neurological:  denies headaches, extremity weakness  Psychiatric: denies depression or anxiety.      Vitals:    05/21/25 1517   BP: 118/81   Pulse: 96       PHYSICAL EXAM:   Constitutional: well developed, well nourished  Head/Face: normocephalic  Neck/Thyroid: thyroid symmetric, no thyromegaly, no nodules, no adenopathy  Heart: Regular rate and rhythm   Lungs: clear to ascultation bilaterally   Lymphatic:no abnormal supraclavicular or axillary adenopathy is noted  Breast: normal without palpable masses, tenderness, asymmetry, nipple discharge, nipple retraction or skin changes  Abdomen:  soft, nontender, nondistended, no masses  Skin/Hair: no unusual rashes or bruises  Extremities: no edema, no cyanosis  Psychiatric: Appropriate mood and affect    Pelvic Exam:  External Genitalia: normal appearance, hair distribution, and no lesions  Urethral Meatus:  normal in size, location, without lesions and prolapse  Bladder:  No fullness, masses or tenderness  Vagina:  Normal appearance without lesions, no abnormal discharge  Cervix:  Normal without tenderness on  motion  Uterus: normal in size, contour, position, mobility, without tenderness  Adnexa: normal without masses or tenderness  Perineum: normal    Assessment/Plan:  Cora was seen today for gyn exam.    Diagnoses and all orders for this visit:    Well woman exam    Cervical cancer screening    Screening mammogram, encounter for  -     [facility] HALLIE 2D+3D SCREENING BILAT (CPT=77067/75196); Future        WWE:   Reviewed ASCCP guidelines with the patient -- Pap today  Contraception: condoms  Breast Health:     Mammo @ 42 yo  Encouraged monthly self breast exams with the patient   Discussed diet, exercise, MVIs with Vit D  Follow up in 1 yr for WWE

## 2025-05-22 LAB — HPV E6+E7 MRNA CVX QL NAA+PROBE: NEGATIVE

## 2025-06-19 ENCOUNTER — OFFICE VISIT (OUTPATIENT)
Dept: FAMILY MEDICINE CLINIC | Facility: CLINIC | Age: 40
End: 2025-06-19
Payer: COMMERCIAL

## 2025-06-19 VITALS
HEART RATE: 60 BPM | HEIGHT: 62.5 IN | DIASTOLIC BLOOD PRESSURE: 78 MMHG | WEIGHT: 263 LBS | SYSTOLIC BLOOD PRESSURE: 125 MMHG | BODY MASS INDEX: 47.19 KG/M2

## 2025-06-19 DIAGNOSIS — L72.0 INCLUSION CYST: Primary | ICD-10-CM

## 2025-06-19 DIAGNOSIS — E66.01 SEVERE OBESITY (HCC): ICD-10-CM

## 2025-06-19 PROCEDURE — 3008F BODY MASS INDEX DOCD: CPT | Performed by: FAMILY MEDICINE

## 2025-06-19 PROCEDURE — 3074F SYST BP LT 130 MM HG: CPT | Performed by: FAMILY MEDICINE

## 2025-06-19 PROCEDURE — 3078F DIAST BP <80 MM HG: CPT | Performed by: FAMILY MEDICINE

## 2025-06-19 PROCEDURE — 99213 OFFICE O/P EST LOW 20 MIN: CPT | Performed by: FAMILY MEDICINE

## 2025-06-19 NOTE — PATIENT INSTRUCTIONS
READ OUTLIVE BY DR. TERA ADAMS     (TIME RESTRICTED EATING/ CONTINUOUS GLUCOSE MONITOR)      STELUIS BY DEXCOM

## 2025-06-19 NOTE — PROGRESS NOTES
Subjective:   Cora Melendez is a 40 year old female who presents for Weight Check and Bump (Left lower extremity Leg.)       History/Other:   History of Present Illness  Cora Melendez is a 40 year old female who presents with concerns about a leg cyst and weight loss.    She has a cyst on her leg that has been present for a long time and has increased in size. The lesion is not painful but is cosmetically bothersome. She is considering removal but is concerned about potential scarring. She has not had any previous surgeries or stitches and is considering scheduling the removal towards the end of summer.    She is experiencing difficulty with weight loss despite trying various methods. She recently had a baby and is now using condoms for contraception. She is exploring time-restricted eating and continuous glucose monitoring to manage her weight but has not started any new medications for weight loss.   Chief Complaint Reviewed and Verified  Nursing Notes Reviewed and   Verified  Tobacco Reviewed  Allergies Reviewed  Medications Reviewed    Medical History Reviewed  Surgical History Reviewed  OB Status Reviewed    Family History Reviewed  Social History Reviewed         Tobacco:  She has never smoked tobacco.    Current Medications[1]           Review of Systems:  Pertinent items are noted in HPI.      Objective:   /78 (BP Location: Left arm, Patient Position: Sitting, Cuff Size: large)   Pulse 60   Ht 5' 2.5\" (1.588 m)   Wt 163 lb (73.9 kg)   LMP 06/15/2025 (Approximate)   BMI 29.34 kg/m²  Estimated body mass index is 29.34 kg/m² as calculated from the following:    Height as of this encounter: 5' 2.5\" (1.588 m).    Weight as of this encounter: 163 lb (73.9 kg).  Results         Physical Exam  SKIN: Epidermal inclusion cyst on LEFT leg, non-painful.      Assessment & Plan:   There are no diagnoses linked to this encounter.  Assessment & Plan  Weight Management  She seeks assistance with  postpartum weight loss and has decided against having more children. Various options were discussed, including GLP-1 injections, which are effective but require lifelong use and may cause muscle wasting. Concerns were raised about initiating this treatment at her age. Other options include time-restricted eating, continuous glucose monitoring, gastric bypass surgery, and medications like phentermine and Vyvanse. Emphasis was placed on reducing insulin levels to aid weight loss and using a continuous glucose monitor to identify dietary habits contributing to weight gain. Gastric bypass surgery may prevent diabetes even if weight is regained.  - Provide information on time-restricted eating and continuous glucose monitoring from the 'Outlive' book by Dr. Kang Holloway.  - Suggest purchasing a continuous glucose monitor from [employer] for $100/month to track dietary impacts on glucose levels.  - Discuss gastric bypass surgery as an option, noting its potential to prevent diabetes even if weight is regained.  - Consider medications like phentermine or Vyvanse, acknowledging the need for continuous use and potential insurance coverage issues.    Epidermal Inclusion Cyst  She has a longstanding epidermal inclusion cyst on her leg, which has increased in size. It is not painful but is considered cosmetically unappealing. There is a potential for keloid formation post-removal, especially in certain ethnic groups, and the need to protect the area from sun exposure for 9-12 months post-procedure to ensure optimal scar appearance. Removal is suggested due to its size and potential annoyance during shaving.  - Schedule a 30-minute appointment for cyst removal towards the end of summer.  - Advise her to keep the area out of the sun for 9-12 months post-removal or use a waterproof bandage.    Recording duration: 6 minutes        Return for MOLE REMOVAL 30 MINUTES .        Mayito Fuller DO, 6/19/2025, 11:40 AM              [1]   No current outpatient medications on file.

## (undated) NOTE — LETTER
3/17/2020          To Whom It May Concern:    Pal Kilgore is currently under my medical care and may not return to work at this time. Please allow Kinza Echavarria to work from home as needed as her condition puts her at high risk during this time.    Please

## (undated) NOTE — LETTER
3/17/2020          To Whom It May Concern:    Wendy Escobar is currently under my medical care and may not return to work at this time. Please allow Jonathon Jones to work from home as needed as her condition puts her at high risk during this time.    Please

## (undated) NOTE — LETTER
9/21/2020          To Whom It May Concern:    Favio Calvillo is currently under my medical care and may not return to work at this time. Please excuse Ian Altman for 2 days.   She may return to work when she gets her mother's test back to determine what

## (undated) NOTE — LETTER
3/17/2020          To Whom It May Concern:    Debi Gardner is currently under my medical care and may not return to work at this time. Please excuse Cora for 8 weeks.   She may return to work on 5/4/20  Activity is restricted as follows:   Please

## (undated) NOTE — LETTER
AUTHORIZATION FOR SURGICAL OPERATION OR OTHER PROCEDURE    1.  I hereby authorize Dr. Jerral Fothergill and [address] [place] [address] staff assigned to my case to perform the following operation and/or procedure at the [address] [place] [address]:    IUD REMOVAL      ___________ print)      Patient signature:  ___________________________________________________             Relationship to Patient:           []  Parent    Responsible person                          []  Spouse  In case of minor or                    [] Other  ______

## (undated) NOTE — Clinical Note
4/5/2017                Luis Alberto Berger        [address]         Dear Kya Marquez,      It was a pleasure to see you at our [address] [facility] office.  Please know, your most rece

## (undated) NOTE — LETTER
3/16/2020          To Whom It May Concern:    Pal Kilgore is currently under my medical care and may not return to work at this time. Please excuse Cora for 8 weeks. She may return to work on 5/4/20  Activity is restricted as follows: n/a.

## (undated) NOTE — LETTER
5/7/2020          To Whom It May Concern:    Claudia Maravilla is currently under my medical care and may return to work at this time as long as social distancing guidelines are followed. Activity is restricted as follows: none.     If you require addition

## (undated) NOTE — LETTER
[facility] [address], [address]       02/27/20        Patient: Smith Everett   YOB: 1985   Date of Visit: 2/27/2020       Dear  Dr. Candie Major,      Thank you for referring Smith Everett to

## (undated) NOTE — MR AVS SNAPSHOT
After Visit Summary   8/9/2021    Pj Soto    MRN: [id number]           Visit Information     Date & Time  8/9/2021  4:20 PM Provider  Mary Samano MD [address] [place] [address], [address] [place] [address],[address], [facility].  Phone  33 [email or web address]                   DO YOU KNOW WHERE TO GO? Injury & Illness are never convenient. If you are dealing with a   non-emergency, consider your options before heading to an ER.   VIDEO VISITS  Visit face-to-face with [phone]. ([phone])

## (undated) NOTE — LETTER
VACCINE ADMINISTRATION RECORD  PARENT / GUARDIAN APPROVAL  Date: 2023  Vaccine administered to: Rachel López     : 3/24/1985    MRN: [id number]    A copy of the appropriate Centers for Disease Control and Prevention Vaccine Information statement has been provided. I have read or have had explained the information about the diseases and the vaccines listed below. There was an opportunity to ask questions and any questions were answered satisfactorily. I believe that I understand the benefits and risks of the vaccine cited and ask that the vaccine(s) listed below be given to me or to the person named above (for whom I am authorized to make this request). VACCINES ADMINISTERED:  TDAP    I have read and hereby agree to be bound by the terms of this agreement as stated above. My signature is valid until revoked by me in writing. This document is signed by SELF, relationship: SELF on 2023.:                                                                                                                                         Parent / Guardian Signature                                                Date    Arturo Oglesby served as a witness to authentication that the identity of the person signing electronically is in fact the person represented as signing.

## (undated) NOTE — LETTER
[address] [address]  [phone]    5/10/2021        Shonda Shock.  Jarvis Thompson, [address]           Patient: Edwar Tomlinson   YOB: 1985